# Patient Record
Sex: FEMALE | Race: WHITE | HISPANIC OR LATINO | Employment: FULL TIME | URBAN - METROPOLITAN AREA
[De-identification: names, ages, dates, MRNs, and addresses within clinical notes are randomized per-mention and may not be internally consistent; named-entity substitution may affect disease eponyms.]

---

## 2023-07-31 ENCOUNTER — HOSPITAL ENCOUNTER (INPATIENT)
Facility: MEDICAL CENTER | Age: 33
LOS: 3 days | DRG: 419 | End: 2023-08-03
Attending: HOSPITALIST | Admitting: STUDENT IN AN ORGANIZED HEALTH CARE EDUCATION/TRAINING PROGRAM
Payer: COMMERCIAL

## 2023-07-31 DIAGNOSIS — K81.9 CHOLECYSTITIS: ICD-10-CM

## 2023-07-31 DIAGNOSIS — K80.50 CHOLEDOCHOLITHIASIS: ICD-10-CM

## 2023-07-31 PROBLEM — K80.20 CHOLELITHIASIS WITHOUT CHOLANGITIS: Status: ACTIVE | Noted: 2023-07-31

## 2023-07-31 LAB
BASOPHILS # BLD AUTO: 0.4 % (ref 0–1.8)
BASOPHILS # BLD: 0.03 K/UL (ref 0–0.12)
EOSINOPHIL # BLD AUTO: 0.06 K/UL (ref 0–0.51)
EOSINOPHIL NFR BLD: 0.8 % (ref 0–6.9)
ERYTHROCYTE [DISTWIDTH] IN BLOOD BY AUTOMATED COUNT: 39.2 FL (ref 35.9–50)
HCT VFR BLD AUTO: 39.6 % (ref 37–47)
HGB BLD-MCNC: 13.3 G/DL (ref 12–16)
IMM GRANULOCYTES # BLD AUTO: 0.02 K/UL (ref 0–0.11)
IMM GRANULOCYTES NFR BLD AUTO: 0.3 % (ref 0–0.9)
LYMPHOCYTES # BLD AUTO: 1.81 K/UL (ref 1–4.8)
LYMPHOCYTES NFR BLD: 24.6 % (ref 22–41)
MCH RBC QN AUTO: 29.9 PG (ref 27–33)
MCHC RBC AUTO-ENTMCNC: 33.6 G/DL (ref 32.2–35.5)
MCV RBC AUTO: 89 FL (ref 81.4–97.8)
MONOCYTES # BLD AUTO: 0.59 K/UL (ref 0–0.85)
MONOCYTES NFR BLD AUTO: 8 % (ref 0–13.4)
NEUTROPHILS # BLD AUTO: 4.84 K/UL (ref 1.82–7.42)
NEUTROPHILS NFR BLD: 65.9 % (ref 44–72)
NRBC # BLD AUTO: 0 K/UL
NRBC BLD-RTO: 0 /100 WBC (ref 0–0.2)
PLATELET # BLD AUTO: 251 K/UL (ref 164–446)
PMV BLD AUTO: 9.5 FL (ref 9–12.9)
RBC # BLD AUTO: 4.45 M/UL (ref 4.2–5.4)
WBC # BLD AUTO: 7.4 K/UL (ref 4.8–10.8)

## 2023-07-31 PROCEDURE — 99223 1ST HOSP IP/OBS HIGH 75: CPT | Mod: AI | Performed by: STUDENT IN AN ORGANIZED HEALTH CARE EDUCATION/TRAINING PROGRAM

## 2023-07-31 PROCEDURE — 80053 COMPREHEN METABOLIC PANEL: CPT

## 2023-07-31 PROCEDURE — 85652 RBC SED RATE AUTOMATED: CPT

## 2023-07-31 PROCEDURE — 83735 ASSAY OF MAGNESIUM: CPT

## 2023-07-31 PROCEDURE — 36415 COLL VENOUS BLD VENIPUNCTURE: CPT

## 2023-07-31 PROCEDURE — 83605 ASSAY OF LACTIC ACID: CPT

## 2023-07-31 PROCEDURE — 83690 ASSAY OF LIPASE: CPT

## 2023-07-31 PROCEDURE — 84100 ASSAY OF PHOSPHORUS: CPT

## 2023-07-31 PROCEDURE — 85025 COMPLETE CBC W/AUTO DIFF WBC: CPT

## 2023-07-31 PROCEDURE — 85610 PROTHROMBIN TIME: CPT

## 2023-07-31 PROCEDURE — 770001 HCHG ROOM/CARE - MED/SURG/GYN PRIV*

## 2023-07-31 PROCEDURE — 86140 C-REACTIVE PROTEIN: CPT

## 2023-07-31 RX ORDER — BISACODYL 10 MG
10 SUPPOSITORY, RECTAL RECTAL
Status: DISCONTINUED | OUTPATIENT
Start: 2023-07-31 | End: 2023-08-03 | Stop reason: HOSPADM

## 2023-07-31 RX ORDER — OXYCODONE HYDROCHLORIDE 5 MG/1
5 TABLET ORAL 2 TIMES DAILY
Status: ON HOLD | COMMUNITY
End: 2023-08-03

## 2023-07-31 RX ORDER — TRAZODONE HYDROCHLORIDE 50 MG/1
75 TABLET ORAL NIGHTLY
Status: DISCONTINUED | OUTPATIENT
Start: 2023-08-01 | End: 2023-08-03 | Stop reason: HOSPADM

## 2023-07-31 RX ORDER — BUPROPION HYDROCHLORIDE 100 MG/1
150 TABLET ORAL 2 TIMES DAILY
COMMUNITY

## 2023-07-31 RX ORDER — CELECOXIB 200 MG/1
200 CAPSULE ORAL 2 TIMES DAILY
Status: DISCONTINUED | OUTPATIENT
Start: 2023-08-01 | End: 2023-08-02

## 2023-07-31 RX ORDER — ONDANSETRON 4 MG/1
4 TABLET, ORALLY DISINTEGRATING ORAL EVERY 4 HOURS PRN
Status: DISCONTINUED | OUTPATIENT
Start: 2023-07-31 | End: 2023-08-03 | Stop reason: HOSPADM

## 2023-07-31 RX ORDER — CELECOXIB 200 MG/1
200 CAPSULE ORAL 2 TIMES DAILY PRN
Status: DISCONTINUED | OUTPATIENT
Start: 2023-08-06 | End: 2023-08-02

## 2023-07-31 RX ORDER — SODIUM CHLORIDE, SODIUM LACTATE, POTASSIUM CHLORIDE, AND CALCIUM CHLORIDE .6; .31; .03; .02 G/100ML; G/100ML; G/100ML; G/100ML
500 INJECTION, SOLUTION INTRAVENOUS
Status: DISCONTINUED | OUTPATIENT
Start: 2023-07-31 | End: 2023-08-03 | Stop reason: HOSPADM

## 2023-07-31 RX ORDER — ACETAMINOPHEN 325 MG/1
650 TABLET ORAL EVERY 6 HOURS
Status: DISCONTINUED | OUTPATIENT
Start: 2023-08-01 | End: 2023-08-03 | Stop reason: HOSPADM

## 2023-07-31 RX ORDER — METHOCARBAMOL 500 MG/1
500 TABLET, FILM COATED ORAL 2 TIMES DAILY PRN
Status: DISCONTINUED | OUTPATIENT
Start: 2023-08-01 | End: 2023-08-03 | Stop reason: HOSPADM

## 2023-07-31 RX ORDER — MORPHINE SULFATE 4 MG/ML
4 INJECTION INTRAVENOUS
Status: DISCONTINUED | OUTPATIENT
Start: 2023-07-31 | End: 2023-08-03 | Stop reason: HOSPADM

## 2023-07-31 RX ORDER — ONDANSETRON 2 MG/ML
4 INJECTION INTRAMUSCULAR; INTRAVENOUS EVERY 4 HOURS PRN
Status: DISCONTINUED | OUTPATIENT
Start: 2023-07-31 | End: 2023-08-03 | Stop reason: HOSPADM

## 2023-07-31 RX ORDER — POLYETHYLENE GLYCOL 3350 17 G/17G
1 POWDER, FOR SOLUTION ORAL
Status: DISCONTINUED | OUTPATIENT
Start: 2023-07-31 | End: 2023-08-03 | Stop reason: HOSPADM

## 2023-07-31 RX ORDER — ACETAMINOPHEN 325 MG/1
650 TABLET ORAL EVERY 6 HOURS PRN
Status: DISCONTINUED | OUTPATIENT
Start: 2023-08-06 | End: 2023-08-03 | Stop reason: HOSPADM

## 2023-07-31 RX ORDER — BUPROPION HYDROCHLORIDE 75 MG/1
150 TABLET ORAL 2 TIMES DAILY
Status: DISCONTINUED | OUTPATIENT
Start: 2023-08-01 | End: 2023-08-01

## 2023-07-31 RX ORDER — OXYCODONE HYDROCHLORIDE 5 MG/1
5 TABLET ORAL
Status: DISCONTINUED | OUTPATIENT
Start: 2023-07-31 | End: 2023-07-31

## 2023-07-31 RX ORDER — LABETALOL HYDROCHLORIDE 5 MG/ML
10 INJECTION, SOLUTION INTRAVENOUS EVERY 4 HOURS PRN
Status: DISCONTINUED | OUTPATIENT
Start: 2023-07-31 | End: 2023-08-03 | Stop reason: HOSPADM

## 2023-07-31 RX ORDER — FEXOFENADINE HCL 60 MG/1
60 TABLET, FILM COATED ORAL DAILY
Status: DISCONTINUED | OUTPATIENT
Start: 2023-08-01 | End: 2023-08-03 | Stop reason: HOSPADM

## 2023-07-31 RX ORDER — TRAZODONE HYDROCHLORIDE 50 MG/1
75 TABLET ORAL NIGHTLY
COMMUNITY

## 2023-07-31 RX ORDER — METRONIDAZOLE 500 MG/100ML
500 INJECTION, SOLUTION INTRAVENOUS EVERY 12 HOURS
Status: DISCONTINUED | OUTPATIENT
Start: 2023-07-31 | End: 2023-08-02

## 2023-07-31 RX ORDER — FEXOFENADINE HCL 60 MG/1
60 TABLET, FILM COATED ORAL DAILY
COMMUNITY

## 2023-07-31 RX ORDER — GABAPENTIN 300 MG/1
300 CAPSULE ORAL 3 TIMES DAILY
Status: DISCONTINUED | OUTPATIENT
Start: 2023-08-01 | End: 2023-08-03 | Stop reason: HOSPADM

## 2023-07-31 RX ORDER — MONTELUKAST SODIUM 10 MG/1
10 TABLET ORAL DAILY
Status: DISCONTINUED | OUTPATIENT
Start: 2023-08-01 | End: 2023-08-03 | Stop reason: HOSPADM

## 2023-07-31 RX ORDER — OXYCODONE HYDROCHLORIDE 5 MG/1
5 TABLET ORAL
Status: DISCONTINUED | OUTPATIENT
Start: 2023-07-31 | End: 2023-08-03 | Stop reason: HOSPADM

## 2023-07-31 RX ORDER — GABAPENTIN 100 MG/1
300 CAPSULE ORAL 3 TIMES DAILY
COMMUNITY

## 2023-07-31 RX ORDER — PROCHLORPERAZINE EDISYLATE 5 MG/ML
5-10 INJECTION INTRAMUSCULAR; INTRAVENOUS EVERY 4 HOURS PRN
Status: DISCONTINUED | OUTPATIENT
Start: 2023-07-31 | End: 2023-08-03 | Stop reason: HOSPADM

## 2023-07-31 RX ORDER — MONTELUKAST SODIUM 10 MG/1
10 TABLET ORAL DAILY
COMMUNITY

## 2023-07-31 RX ORDER — AMOXICILLIN 250 MG
2 CAPSULE ORAL 2 TIMES DAILY
Status: DISCONTINUED | OUTPATIENT
Start: 2023-07-31 | End: 2023-08-03 | Stop reason: HOSPADM

## 2023-07-31 RX ORDER — PROMETHAZINE HYDROCHLORIDE 25 MG/1
12.5-25 TABLET ORAL EVERY 4 HOURS PRN
Status: DISCONTINUED | OUTPATIENT
Start: 2023-07-31 | End: 2023-08-03 | Stop reason: HOSPADM

## 2023-07-31 RX ORDER — OXYCODONE HYDROCHLORIDE 10 MG/1
10 TABLET ORAL
Status: DISCONTINUED | OUTPATIENT
Start: 2023-07-31 | End: 2023-08-03 | Stop reason: HOSPADM

## 2023-07-31 RX ORDER — ALBUTEROL SULFATE 90 UG/1
2 AEROSOL, METERED RESPIRATORY (INHALATION) EVERY 6 HOURS PRN
Status: DISCONTINUED | OUTPATIENT
Start: 2023-07-31 | End: 2023-08-03 | Stop reason: HOSPADM

## 2023-07-31 RX ORDER — PROMETHAZINE HYDROCHLORIDE 25 MG/1
12.5-25 SUPPOSITORY RECTAL EVERY 4 HOURS PRN
Status: DISCONTINUED | OUTPATIENT
Start: 2023-07-31 | End: 2023-08-03 | Stop reason: HOSPADM

## 2023-07-31 RX ORDER — MORPHINE SULFATE 4 MG/ML
2 INJECTION INTRAVENOUS
Status: DISCONTINUED | OUTPATIENT
Start: 2023-07-31 | End: 2023-07-31

## 2023-07-31 RX ORDER — METHOCARBAMOL 500 MG/1
500 TABLET, FILM COATED ORAL 2 TIMES DAILY
COMMUNITY

## 2023-07-31 RX ORDER — OXYCODONE HYDROCHLORIDE 5 MG/1
2.5 TABLET ORAL
Status: DISCONTINUED | OUTPATIENT
Start: 2023-07-31 | End: 2023-07-31

## 2023-07-31 RX ORDER — ACETAMINOPHEN 325 MG/1
650 TABLET ORAL EVERY 6 HOURS PRN
Status: DISCONTINUED | OUTPATIENT
Start: 2023-07-31 | End: 2023-08-01

## 2023-07-31 RX ORDER — ALBUTEROL SULFATE 90 UG/1
2 AEROSOL, METERED RESPIRATORY (INHALATION) EVERY 6 HOURS PRN
COMMUNITY

## 2023-07-31 ASSESSMENT — PAIN DESCRIPTION - PAIN TYPE: TYPE: ACUTE PAIN

## 2023-08-01 ENCOUNTER — ANESTHESIA EVENT (OUTPATIENT)
Dept: SURGERY | Facility: MEDICAL CENTER | Age: 33
DRG: 419 | End: 2023-08-01
Payer: COMMERCIAL

## 2023-08-01 ENCOUNTER — ANESTHESIA (OUTPATIENT)
Dept: SURGERY | Facility: MEDICAL CENTER | Age: 33
DRG: 419 | End: 2023-08-01
Payer: COMMERCIAL

## 2023-08-01 ENCOUNTER — APPOINTMENT (OUTPATIENT)
Dept: RADIOLOGY | Facility: MEDICAL CENTER | Age: 33
DRG: 419 | End: 2023-08-01
Attending: SURGERY
Payer: COMMERCIAL

## 2023-08-01 PROBLEM — G47.33 OSA (OBSTRUCTIVE SLEEP APNEA): Status: ACTIVE | Noted: 2023-08-01

## 2023-08-01 PROBLEM — K80.40 CHOLEDOCHOLITHIASIS WITH CHOLECYSTITIS: Status: ACTIVE | Noted: 2023-07-31

## 2023-08-01 PROBLEM — J45.909 ASTHMA: Status: ACTIVE | Noted: 2023-08-01

## 2023-08-01 PROBLEM — K80.20 CHOLELITHIASIS WITHOUT CHOLANGITIS: Status: RESOLVED | Noted: 2023-07-31 | Resolved: 2023-08-01

## 2023-08-01 PROBLEM — G89.4 CHRONIC PAIN SYNDROME: Status: ACTIVE | Noted: 2023-08-01

## 2023-08-01 PROBLEM — G47.00 INSOMNIA: Status: ACTIVE | Noted: 2023-08-01

## 2023-08-01 PROBLEM — R10.9 ABDOMINAL PAIN: Status: RESOLVED | Noted: 2023-08-01 | Resolved: 2023-08-01

## 2023-08-01 PROBLEM — K80.50 CHOLEDOCHOLITHIASIS: Status: ACTIVE | Noted: 2023-08-01

## 2023-08-01 PROBLEM — R11.2 INTRACTABLE NAUSEA AND VOMITING: Status: RESOLVED | Noted: 2023-08-01 | Resolved: 2023-08-01

## 2023-08-01 PROBLEM — R10.9 ABDOMINAL PAIN: Status: ACTIVE | Noted: 2023-08-01

## 2023-08-01 PROBLEM — E87.6 HYPOKALEMIA: Status: ACTIVE | Noted: 2023-08-01

## 2023-08-01 PROBLEM — R11.2 INTRACTABLE NAUSEA AND VOMITING: Status: ACTIVE | Noted: 2023-08-01

## 2023-08-01 LAB
ALBUMIN SERPL BCP-MCNC: 3.5 G/DL (ref 3.2–4.9)
ALBUMIN SERPL BCP-MCNC: 3.8 G/DL (ref 3.2–4.9)
ALBUMIN/GLOB SERPL: 1.5 G/DL
ALBUMIN/GLOB SERPL: 1.7 G/DL
ALP SERPL-CCNC: 67 U/L (ref 30–99)
ALP SERPL-CCNC: 71 U/L (ref 30–99)
ALT SERPL-CCNC: 32 U/L (ref 2–50)
ALT SERPL-CCNC: 39 U/L (ref 2–50)
ANION GAP SERPL CALC-SCNC: 10 MMOL/L (ref 7–16)
ANION GAP SERPL CALC-SCNC: 8 MMOL/L (ref 7–16)
APPEARANCE UR: CLEAR
AST SERPL-CCNC: 21 U/L (ref 12–45)
AST SERPL-CCNC: 28 U/L (ref 12–45)
BASOPHILS # BLD AUTO: 0.5 % (ref 0–1.8)
BASOPHILS # BLD: 0.03 K/UL (ref 0–0.12)
BILIRUB SERPL-MCNC: 0.4 MG/DL (ref 0.1–1.5)
BILIRUB SERPL-MCNC: 0.5 MG/DL (ref 0.1–1.5)
BILIRUB UR QL STRIP.AUTO: NEGATIVE
BUN SERPL-MCNC: 5 MG/DL (ref 8–22)
BUN SERPL-MCNC: 6 MG/DL (ref 8–22)
CALCIUM ALBUM COR SERPL-MCNC: 8.2 MG/DL (ref 8.5–10.5)
CALCIUM ALBUM COR SERPL-MCNC: 8.7 MG/DL (ref 8.5–10.5)
CALCIUM SERPL-MCNC: 7.8 MG/DL (ref 8.5–10.5)
CALCIUM SERPL-MCNC: 8.5 MG/DL (ref 8.5–10.5)
CHLORIDE SERPL-SCNC: 106 MMOL/L (ref 96–112)
CHLORIDE SERPL-SCNC: 108 MMOL/L (ref 96–112)
CO2 SERPL-SCNC: 25 MMOL/L (ref 20–33)
CO2 SERPL-SCNC: 25 MMOL/L (ref 20–33)
COLOR UR: YELLOW
CREAT SERPL-MCNC: 0.48 MG/DL (ref 0.5–1.4)
CREAT SERPL-MCNC: 0.51 MG/DL (ref 0.5–1.4)
CRP SERPL HS-MCNC: <0.3 MG/DL (ref 0–0.75)
EOSINOPHIL # BLD AUTO: 0.08 K/UL (ref 0–0.51)
EOSINOPHIL NFR BLD: 1.4 % (ref 0–6.9)
ERYTHROCYTE [DISTWIDTH] IN BLOOD BY AUTOMATED COUNT: 40.8 FL (ref 35.9–50)
ERYTHROCYTE [SEDIMENTATION RATE] IN BLOOD BY WESTERGREN METHOD: 6 MM/HOUR (ref 0–25)
GFR SERPLBLD CREATININE-BSD FMLA CKD-EPI: 127 ML/MIN/1.73 M 2
GFR SERPLBLD CREATININE-BSD FMLA CKD-EPI: 129 ML/MIN/1.73 M 2
GLOBULIN SER CALC-MCNC: 2.3 G/DL (ref 1.9–3.5)
GLOBULIN SER CALC-MCNC: 2.3 G/DL (ref 1.9–3.5)
GLUCOSE SERPL-MCNC: 89 MG/DL (ref 65–99)
GLUCOSE SERPL-MCNC: 96 MG/DL (ref 65–99)
GLUCOSE UR STRIP.AUTO-MCNC: NEGATIVE MG/DL
HCG UR QL: NEGATIVE
HCT VFR BLD AUTO: 38.7 % (ref 37–47)
HGB BLD-MCNC: 12.7 G/DL (ref 12–16)
IMM GRANULOCYTES # BLD AUTO: 0.01 K/UL (ref 0–0.11)
IMM GRANULOCYTES NFR BLD AUTO: 0.2 % (ref 0–0.9)
INR PPP: 1.28 (ref 0.87–1.13)
KETONES UR STRIP.AUTO-MCNC: ABNORMAL MG/DL
LACTATE SERPL-SCNC: 0.9 MMOL/L (ref 0.5–2)
LEUKOCYTE ESTERASE UR QL STRIP.AUTO: NEGATIVE
LIPASE SERPL-CCNC: 12 U/L (ref 11–82)
LYMPHOCYTES # BLD AUTO: 2.05 K/UL (ref 1–4.8)
LYMPHOCYTES NFR BLD: 35.8 % (ref 22–41)
MAGNESIUM SERPL-MCNC: 2.1 MG/DL (ref 1.5–2.5)
MCH RBC QN AUTO: 29.7 PG (ref 27–33)
MCHC RBC AUTO-ENTMCNC: 32.8 G/DL (ref 32.2–35.5)
MCV RBC AUTO: 90.6 FL (ref 81.4–97.8)
MICRO URNS: ABNORMAL
MONOCYTES # BLD AUTO: 0.44 K/UL (ref 0–0.85)
MONOCYTES NFR BLD AUTO: 7.7 % (ref 0–13.4)
NEUTROPHILS # BLD AUTO: 3.11 K/UL (ref 1.82–7.42)
NEUTROPHILS NFR BLD: 54.4 % (ref 44–72)
NITRITE UR QL STRIP.AUTO: NEGATIVE
NRBC # BLD AUTO: 0 K/UL
NRBC BLD-RTO: 0 /100 WBC (ref 0–0.2)
PATHOLOGY CONSULT NOTE: NORMAL
PH UR STRIP.AUTO: 5.5 [PH] (ref 5–8)
PHOSPHATE SERPL-MCNC: 3.2 MG/DL (ref 2.5–4.5)
PLATELET # BLD AUTO: 243 K/UL (ref 164–446)
PMV BLD AUTO: 9.6 FL (ref 9–12.9)
POTASSIUM SERPL-SCNC: 3.4 MMOL/L (ref 3.6–5.5)
POTASSIUM SERPL-SCNC: 3.9 MMOL/L (ref 3.6–5.5)
PROT SERPL-MCNC: 5.8 G/DL (ref 6–8.2)
PROT SERPL-MCNC: 6.1 G/DL (ref 6–8.2)
PROT UR QL STRIP: NEGATIVE MG/DL
PROTHROMBIN TIME: 15.8 SEC (ref 12–14.6)
RBC # BLD AUTO: 4.27 M/UL (ref 4.2–5.4)
RBC UR QL AUTO: NEGATIVE
SODIUM SERPL-SCNC: 141 MMOL/L (ref 135–145)
SODIUM SERPL-SCNC: 141 MMOL/L (ref 135–145)
SP GR UR STRIP.AUTO: >=1.045
UROBILINOGEN UR STRIP.AUTO-MCNC: 1 MG/DL
WBC # BLD AUTO: 5.7 K/UL (ref 4.8–10.8)

## 2023-08-01 PROCEDURE — 74300 X-RAY BILE DUCTS/PANCREAS: CPT

## 2023-08-01 PROCEDURE — 81003 URINALYSIS AUTO W/O SCOPE: CPT

## 2023-08-01 PROCEDURE — 47563 LAPARO CHOLECYSTECTOMY/GRAPH: CPT | Mod: AS | Performed by: NURSE PRACTITIONER

## 2023-08-01 PROCEDURE — 700102 HCHG RX REV CODE 250 W/ 637 OVERRIDE(OP): Performed by: STUDENT IN AN ORGANIZED HEALTH CARE EDUCATION/TRAINING PROGRAM

## 2023-08-01 PROCEDURE — 700102 HCHG RX REV CODE 250 W/ 637 OVERRIDE(OP): Performed by: ANESTHESIOLOGY

## 2023-08-01 PROCEDURE — 99222 1ST HOSP IP/OBS MODERATE 55: CPT | Mod: 57 | Performed by: SURGERY

## 2023-08-01 PROCEDURE — 700111 HCHG RX REV CODE 636 W/ 250 OVERRIDE (IP): Performed by: ANESTHESIOLOGY

## 2023-08-01 PROCEDURE — 36415 COLL VENOUS BLD VENIPUNCTURE: CPT

## 2023-08-01 PROCEDURE — 160041 HCHG SURGERY MINUTES - EA ADDL 1 MIN LEVEL 4: Performed by: SURGERY

## 2023-08-01 PROCEDURE — 160035 HCHG PACU - 1ST 60 MINS PHASE I: Performed by: SURGERY

## 2023-08-01 PROCEDURE — 160002 HCHG RECOVERY MINUTES (STAT): Performed by: SURGERY

## 2023-08-01 PROCEDURE — 700105 HCHG RX REV CODE 258: Performed by: ANESTHESIOLOGY

## 2023-08-01 PROCEDURE — A9270 NON-COVERED ITEM OR SERVICE: HCPCS | Performed by: STUDENT IN AN ORGANIZED HEALTH CARE EDUCATION/TRAINING PROGRAM

## 2023-08-01 PROCEDURE — 160009 HCHG ANES TIME/MIN: Performed by: SURGERY

## 2023-08-01 PROCEDURE — 99222 1ST HOSP IP/OBS MODERATE 55: CPT | Performed by: INTERNAL MEDICINE

## 2023-08-01 PROCEDURE — 160029 HCHG SURGERY MINUTES - 1ST 30 MINS LEVEL 4: Performed by: SURGERY

## 2023-08-01 PROCEDURE — 88304 TISSUE EXAM BY PATHOLOGIST: CPT

## 2023-08-01 PROCEDURE — 700105 HCHG RX REV CODE 258: Mod: JZ | Performed by: ANESTHESIOLOGY

## 2023-08-01 PROCEDURE — 47563 LAPARO CHOLECYSTECTOMY/GRAPH: CPT | Performed by: SURGERY

## 2023-08-01 PROCEDURE — 110371 HCHG SHELL REV 272: Performed by: SURGERY

## 2023-08-01 PROCEDURE — 700111 HCHG RX REV CODE 636 W/ 250 OVERRIDE (IP): Mod: JZ | Performed by: STUDENT IN AN ORGANIZED HEALTH CARE EDUCATION/TRAINING PROGRAM

## 2023-08-01 PROCEDURE — 160048 HCHG OR STATISTICAL LEVEL 1-5: Performed by: SURGERY

## 2023-08-01 PROCEDURE — 160036 HCHG PACU - EA ADDL 30 MINS PHASE I: Performed by: SURGERY

## 2023-08-01 PROCEDURE — 0FT44ZZ RESECTION OF GALLBLADDER, PERCUTANEOUS ENDOSCOPIC APPROACH: ICD-10-PCS | Performed by: SURGERY

## 2023-08-01 PROCEDURE — A9270 NON-COVERED ITEM OR SERVICE: HCPCS | Performed by: ANESTHESIOLOGY

## 2023-08-01 PROCEDURE — 80053 COMPREHEN METABOLIC PANEL: CPT

## 2023-08-01 PROCEDURE — 87040 BLOOD CULTURE FOR BACTERIA: CPT

## 2023-08-01 PROCEDURE — 700117 HCHG RX CONTRAST REV CODE 255: Performed by: SURGERY

## 2023-08-01 PROCEDURE — 85025 COMPLETE CBC W/AUTO DIFF WBC: CPT

## 2023-08-01 PROCEDURE — 700111 HCHG RX REV CODE 636 W/ 250 OVERRIDE (IP): Performed by: SURGERY

## 2023-08-01 PROCEDURE — 700101 HCHG RX REV CODE 250: Performed by: STUDENT IN AN ORGANIZED HEALTH CARE EDUCATION/TRAINING PROGRAM

## 2023-08-01 PROCEDURE — 99231 SBSQ HOSP IP/OBS SF/LOW 25: CPT | Performed by: STUDENT IN AN ORGANIZED HEALTH CARE EDUCATION/TRAINING PROGRAM

## 2023-08-01 PROCEDURE — 700101 HCHG RX REV CODE 250: Performed by: ANESTHESIOLOGY

## 2023-08-01 PROCEDURE — BF101ZZ FLUOROSCOPY OF BILE DUCTS USING LOW OSMOLAR CONTRAST: ICD-10-PCS | Performed by: SURGERY

## 2023-08-01 PROCEDURE — 770001 HCHG ROOM/CARE - MED/SURG/GYN PRIV*

## 2023-08-01 PROCEDURE — 81025 URINE PREGNANCY TEST: CPT

## 2023-08-01 RX ORDER — HYDROMORPHONE HYDROCHLORIDE 1 MG/ML
0.2 INJECTION, SOLUTION INTRAMUSCULAR; INTRAVENOUS; SUBCUTANEOUS
Status: DISCONTINUED | OUTPATIENT
Start: 2023-08-01 | End: 2023-08-01 | Stop reason: HOSPADM

## 2023-08-01 RX ORDER — MEPERIDINE HYDROCHLORIDE 25 MG/ML
12.5 INJECTION INTRAMUSCULAR; INTRAVENOUS; SUBCUTANEOUS
Status: DISCONTINUED | OUTPATIENT
Start: 2023-08-01 | End: 2023-08-01 | Stop reason: HOSPADM

## 2023-08-01 RX ORDER — DEXAMETHASONE SODIUM PHOSPHATE 4 MG/ML
INJECTION, SOLUTION INTRA-ARTICULAR; INTRALESIONAL; INTRAMUSCULAR; INTRAVENOUS; SOFT TISSUE PRN
Status: DISCONTINUED | OUTPATIENT
Start: 2023-08-01 | End: 2023-08-01 | Stop reason: SURG

## 2023-08-01 RX ORDER — CEFOTETAN DISODIUM 2 G/20ML
INJECTION, POWDER, FOR SOLUTION INTRAMUSCULAR; INTRAVENOUS PRN
Status: DISCONTINUED | OUTPATIENT
Start: 2023-08-01 | End: 2023-08-01 | Stop reason: SURG

## 2023-08-01 RX ORDER — LIDOCAINE HYDROCHLORIDE 20 MG/ML
INJECTION, SOLUTION EPIDURAL; INFILTRATION; INTRACAUDAL; PERINEURAL PRN
Status: DISCONTINUED | OUTPATIENT
Start: 2023-08-01 | End: 2023-08-01 | Stop reason: SURG

## 2023-08-01 RX ORDER — DIPHENHYDRAMINE HYDROCHLORIDE 50 MG/ML
12.5 INJECTION INTRAMUSCULAR; INTRAVENOUS
Status: DISCONTINUED | OUTPATIENT
Start: 2023-08-01 | End: 2023-08-01 | Stop reason: HOSPADM

## 2023-08-01 RX ORDER — OXYCODONE HCL 5 MG/5 ML
10 SOLUTION, ORAL ORAL
Status: COMPLETED | OUTPATIENT
Start: 2023-08-01 | End: 2023-08-01

## 2023-08-01 RX ORDER — HYDRALAZINE HYDROCHLORIDE 20 MG/ML
5 INJECTION INTRAMUSCULAR; INTRAVENOUS
Status: DISCONTINUED | OUTPATIENT
Start: 2023-08-01 | End: 2023-08-01 | Stop reason: HOSPADM

## 2023-08-01 RX ORDER — OXYCODONE HCL 5 MG/5 ML
5 SOLUTION, ORAL ORAL
Status: COMPLETED | OUTPATIENT
Start: 2023-08-01 | End: 2023-08-01

## 2023-08-01 RX ORDER — FAMOTIDINE 20 MG/1
20 TABLET, FILM COATED ORAL 2 TIMES DAILY
Status: DISCONTINUED | OUTPATIENT
Start: 2023-08-01 | End: 2023-08-03 | Stop reason: HOSPADM

## 2023-08-01 RX ORDER — SODIUM CHLORIDE, SODIUM LACTATE, POTASSIUM CHLORIDE, CALCIUM CHLORIDE 600; 310; 30; 20 MG/100ML; MG/100ML; MG/100ML; MG/100ML
INJECTION, SOLUTION INTRAVENOUS CONTINUOUS
Status: DISCONTINUED | OUTPATIENT
Start: 2023-08-01 | End: 2023-08-01 | Stop reason: HOSPADM

## 2023-08-01 RX ORDER — KETOROLAC TROMETHAMINE 30 MG/ML
15 INJECTION, SOLUTION INTRAMUSCULAR; INTRAVENOUS EVERY 6 HOURS
Status: DISCONTINUED | OUTPATIENT
Start: 2023-08-01 | End: 2023-08-03 | Stop reason: HOSPADM

## 2023-08-01 RX ORDER — METOCLOPRAMIDE HYDROCHLORIDE 5 MG/ML
INJECTION INTRAMUSCULAR; INTRAVENOUS PRN
Status: DISCONTINUED | OUTPATIENT
Start: 2023-08-01 | End: 2023-08-01 | Stop reason: SURG

## 2023-08-01 RX ORDER — LABETALOL HYDROCHLORIDE 5 MG/ML
5 INJECTION, SOLUTION INTRAVENOUS
Status: DISCONTINUED | OUTPATIENT
Start: 2023-08-01 | End: 2023-08-01 | Stop reason: HOSPADM

## 2023-08-01 RX ORDER — ONDANSETRON 2 MG/ML
INJECTION INTRAMUSCULAR; INTRAVENOUS PRN
Status: DISCONTINUED | OUTPATIENT
Start: 2023-08-01 | End: 2023-08-01 | Stop reason: SURG

## 2023-08-01 RX ORDER — KETOROLAC TROMETHAMINE 30 MG/ML
INJECTION, SOLUTION INTRAMUSCULAR; INTRAVENOUS PRN
Status: DISCONTINUED | OUTPATIENT
Start: 2023-08-01 | End: 2023-08-01 | Stop reason: SURG

## 2023-08-01 RX ORDER — SODIUM CHLORIDE, SODIUM LACTATE, POTASSIUM CHLORIDE, CALCIUM CHLORIDE 600; 310; 30; 20 MG/100ML; MG/100ML; MG/100ML; MG/100ML
INJECTION, SOLUTION INTRAVENOUS
Status: DISCONTINUED | OUTPATIENT
Start: 2023-08-01 | End: 2023-08-01 | Stop reason: SURG

## 2023-08-01 RX ORDER — ONDANSETRON 2 MG/ML
4 INJECTION INTRAMUSCULAR; INTRAVENOUS
Status: DISCONTINUED | OUTPATIENT
Start: 2023-08-01 | End: 2023-08-01 | Stop reason: HOSPADM

## 2023-08-01 RX ORDER — SODIUM CHLORIDE AND POTASSIUM CHLORIDE 300; 900 MG/100ML; MG/100ML
1000 INJECTION, SOLUTION INTRAVENOUS CONTINUOUS
Status: DISPENSED | OUTPATIENT
Start: 2023-08-01 | End: 2023-08-01

## 2023-08-01 RX ORDER — HYDROMORPHONE HYDROCHLORIDE 1 MG/ML
0.1 INJECTION, SOLUTION INTRAMUSCULAR; INTRAVENOUS; SUBCUTANEOUS
Status: DISCONTINUED | OUTPATIENT
Start: 2023-08-01 | End: 2023-08-01 | Stop reason: HOSPADM

## 2023-08-01 RX ORDER — BUPIVACAINE HYDROCHLORIDE 2.5 MG/ML
INJECTION, SOLUTION EPIDURAL; INFILTRATION; INTRACAUDAL
Status: DISCONTINUED | OUTPATIENT
Start: 2023-08-01 | End: 2023-08-01 | Stop reason: HOSPADM

## 2023-08-01 RX ORDER — HALOPERIDOL 5 MG/ML
1 INJECTION INTRAMUSCULAR
Status: DISCONTINUED | OUTPATIENT
Start: 2023-08-01 | End: 2023-08-01 | Stop reason: HOSPADM

## 2023-08-01 RX ORDER — HYDROMORPHONE HYDROCHLORIDE 1 MG/ML
0.4 INJECTION, SOLUTION INTRAMUSCULAR; INTRAVENOUS; SUBCUTANEOUS
Status: DISCONTINUED | OUTPATIENT
Start: 2023-08-01 | End: 2023-08-01 | Stop reason: HOSPADM

## 2023-08-01 RX ADMIN — CEFOTETAN DISODIUM 2 G: 2 INJECTION, POWDER, FOR SOLUTION INTRAMUSCULAR; INTRAVENOUS at 10:19

## 2023-08-01 RX ADMIN — POTASSIUM CHLORIDE AND SODIUM CHLORIDE 1000 ML: 900; 300 INJECTION, SOLUTION INTRAVENOUS at 01:45

## 2023-08-01 RX ADMIN — GABAPENTIN 300 MG: 300 CAPSULE ORAL at 22:07

## 2023-08-01 RX ADMIN — PROPOFOL 200 MG: 10 INJECTION, EMULSION INTRAVENOUS at 10:19

## 2023-08-01 RX ADMIN — PROCHLORPERAZINE EDISYLATE 5 MG: 5 INJECTION INTRAMUSCULAR; INTRAVENOUS at 20:46

## 2023-08-01 RX ADMIN — LIDOCAINE HYDROCHLORIDE 100 MG: 20 INJECTION, SOLUTION EPIDURAL; INFILTRATION; INTRACAUDAL at 10:19

## 2023-08-01 RX ADMIN — HYDROMORPHONE HYDROCHLORIDE 0.2 MG: 1 INJECTION, SOLUTION INTRAMUSCULAR; INTRAVENOUS; SUBCUTANEOUS at 11:44

## 2023-08-01 RX ADMIN — EPHEDRINE SULFATE 10 MG: 50 INJECTION, SOLUTION INTRAVENOUS at 10:48

## 2023-08-01 RX ADMIN — KETOROLAC TROMETHAMINE 30 MG: 30 INJECTION, SOLUTION INTRAMUSCULAR; INTRAVENOUS at 10:50

## 2023-08-01 RX ADMIN — FENTANYL CITRATE 25 MCG: 50 INJECTION, SOLUTION INTRAMUSCULAR; INTRAVENOUS at 11:34

## 2023-08-01 RX ADMIN — FENTANYL CITRATE 50 MCG: 50 INJECTION, SOLUTION INTRAMUSCULAR; INTRAVENOUS at 10:40

## 2023-08-01 RX ADMIN — HYDROMORPHONE HYDROCHLORIDE 0.4 MG: 1 INJECTION, SOLUTION INTRAMUSCULAR; INTRAVENOUS; SUBCUTANEOUS at 11:56

## 2023-08-01 RX ADMIN — MONTELUKAST 10 MG: 10 TABLET, FILM COATED ORAL at 05:26

## 2023-08-01 RX ADMIN — HYDROMORPHONE HYDROCHLORIDE 0.2 MG: 1 INJECTION, SOLUTION INTRAMUSCULAR; INTRAVENOUS; SUBCUTANEOUS at 11:50

## 2023-08-01 RX ADMIN — METOCLOPRAMIDE 10 MG: 5 INJECTION, SOLUTION INTRAMUSCULAR; INTRAVENOUS at 10:50

## 2023-08-01 RX ADMIN — MORPHINE SULFATE 4 MG: 4 INJECTION, SOLUTION INTRAMUSCULAR; INTRAVENOUS at 20:46

## 2023-08-01 RX ADMIN — FENTANYL CITRATE 50 MCG: 50 INJECTION, SOLUTION INTRAMUSCULAR; INTRAVENOUS at 10:27

## 2023-08-01 RX ADMIN — TRAZODONE HYDROCHLORIDE 75 MG: 50 TABLET ORAL at 22:07

## 2023-08-01 RX ADMIN — METHOCARBAMOL 1000 MG: 100 INJECTION, SOLUTION INTRAMUSCULAR; INTRAVENOUS at 12:06

## 2023-08-01 RX ADMIN — METRONIDAZOLE 500 MG: 500 INJECTION, SOLUTION INTRAVENOUS at 17:31

## 2023-08-01 RX ADMIN — ONDANSETRON 4 MG: 2 INJECTION INTRAMUSCULAR; INTRAVENOUS at 08:13

## 2023-08-01 RX ADMIN — ONDANSETRON 4 MG: 2 INJECTION INTRAMUSCULAR; INTRAVENOUS at 10:50

## 2023-08-01 RX ADMIN — FEXOFENADINE HCL 60 MG: 60 TABLET, FILM COATED ORAL at 05:26

## 2023-08-01 RX ADMIN — SUGAMMADEX 200 MG: 100 INJECTION, SOLUTION INTRAVENOUS at 10:52

## 2023-08-01 RX ADMIN — PROMETHAZINE HYDROCHLORIDE 25 MG: 25 TABLET ORAL at 18:48

## 2023-08-01 RX ADMIN — SODIUM CHLORIDE, POTASSIUM CHLORIDE, SODIUM LACTATE AND CALCIUM CHLORIDE: 600; 310; 30; 20 INJECTION, SOLUTION INTRAVENOUS at 10:17

## 2023-08-01 RX ADMIN — ONDANSETRON 4 MG: 2 INJECTION INTRAMUSCULAR; INTRAVENOUS at 17:04

## 2023-08-01 RX ADMIN — FENTANYL CITRATE 50 MCG: 50 INJECTION, SOLUTION INTRAMUSCULAR; INTRAVENOUS at 11:22

## 2023-08-01 RX ADMIN — FAMOTIDINE 20 MG: 10 INJECTION INTRAVENOUS at 05:26

## 2023-08-01 RX ADMIN — ACETAMINOPHEN 650 MG: 325 TABLET, FILM COATED ORAL at 05:26

## 2023-08-01 RX ADMIN — KETOROLAC TROMETHAMINE 15 MG: 30 INJECTION, SOLUTION INTRAMUSCULAR; INTRAVENOUS at 17:04

## 2023-08-01 RX ADMIN — FENTANYL CITRATE 25 MCG: 50 INJECTION, SOLUTION INTRAMUSCULAR; INTRAVENOUS at 11:31

## 2023-08-01 RX ADMIN — OXYCODONE HYDROCHLORIDE 10 MG: 10 TABLET ORAL at 05:27

## 2023-08-01 RX ADMIN — ROCURONIUM BROMIDE 50 MG: 10 INJECTION, SOLUTION INTRAVENOUS at 10:19

## 2023-08-01 RX ADMIN — DEXAMETHASONE SODIUM PHOSPHATE 8 MG: 4 INJECTION INTRA-ARTICULAR; INTRALESIONAL; INTRAMUSCULAR; INTRAVENOUS; SOFT TISSUE at 10:19

## 2023-08-01 RX ADMIN — METRONIDAZOLE 500 MG: 500 INJECTION, SOLUTION INTRAVENOUS at 00:34

## 2023-08-01 RX ADMIN — TRAZODONE HYDROCHLORIDE 75 MG: 50 TABLET ORAL at 00:32

## 2023-08-01 RX ADMIN — OXYCODONE HYDROCHLORIDE 10 MG: 10 TABLET ORAL at 18:52

## 2023-08-01 RX ADMIN — ACETAMINOPHEN 650 MG: 325 TABLET, FILM COATED ORAL at 00:31

## 2023-08-01 RX ADMIN — OXYCODONE HYDROCHLORIDE 10 MG: 5 SOLUTION ORAL at 11:21

## 2023-08-01 RX ADMIN — OXYCODONE HYDROCHLORIDE 10 MG: 10 TABLET ORAL at 00:31

## 2023-08-01 RX ADMIN — METRONIDAZOLE 500 MG: 500 INJECTION, SOLUTION INTRAVENOUS at 05:26

## 2023-08-01 RX ADMIN — MIDAZOLAM 2 MG: 1 INJECTION, SOLUTION INTRAMUSCULAR; INTRAVENOUS at 10:17

## 2023-08-01 ASSESSMENT — ENCOUNTER SYMPTOMS
BRUISES/BLEEDS EASILY: 0
SENSORY CHANGE: 0
BLOOD IN STOOL: 0
VOMITING: 0
MYALGIAS: 0
DEPRESSION: 0
CHILLS: 0
NAUSEA: 1
VOMITING: 1
HEADACHES: 0
BLURRED VISION: 0
HEARTBURN: 1
CHILLS: 1
SHORTNESS OF BREATH: 0
DIARRHEA: 0
FEVER: 0
DIZZINESS: 0
ABDOMINAL PAIN: 1
FOCAL WEAKNESS: 0
CONSTIPATION: 0
DOUBLE VISION: 0
COUGH: 0
PALPITATIONS: 0
WEAKNESS: 1

## 2023-08-01 ASSESSMENT — LIFESTYLE VARIABLES
AVERAGE NUMBER OF DAYS PER WEEK YOU HAVE A DRINK CONTAINING ALCOHOL: 0
EVER FELT BAD OR GUILTY ABOUT YOUR DRINKING: NO
HAVE YOU EVER FELT YOU SHOULD CUT DOWN ON YOUR DRINKING: NO
TOTAL SCORE: 0
DOES PATIENT WANT TO STOP DRINKING: NO
SUBSTANCE_ABUSE: 0
ALCOHOL_USE: YES
HAVE PEOPLE ANNOYED YOU BY CRITICIZING YOUR DRINKING: NO
TOTAL SCORE: 0
HOW MANY TIMES IN THE PAST YEAR HAVE YOU HAD 5 OR MORE DRINKS IN A DAY: 0
EVER HAD A DRINK FIRST THING IN THE MORNING TO STEADY YOUR NERVES TO GET RID OF A HANGOVER: NO
TOTAL SCORE: 0
CONSUMPTION TOTAL: NEGATIVE
ON A TYPICAL DAY WHEN YOU DRINK ALCOHOL HOW MANY DRINKS DO YOU HAVE: 2

## 2023-08-01 ASSESSMENT — COGNITIVE AND FUNCTIONAL STATUS - GENERAL
SUGGESTED CMS G CODE MODIFIER MOBILITY: CH
SUGGESTED CMS G CODE MODIFIER DAILY ACTIVITY: CH
MOBILITY SCORE: 24
DAILY ACTIVITIY SCORE: 24

## 2023-08-01 ASSESSMENT — PAIN DESCRIPTION - PAIN TYPE
TYPE: ACUTE PAIN

## 2023-08-01 ASSESSMENT — PATIENT HEALTH QUESTIONNAIRE - PHQ9
1. LITTLE INTEREST OR PLEASURE IN DOING THINGS: NOT AT ALL
2. FEELING DOWN, DEPRESSED, IRRITABLE, OR HOPELESS: NOT AT ALL
SUM OF ALL RESPONSES TO PHQ9 QUESTIONS 1 AND 2: 0

## 2023-08-01 NOTE — CARE PLAN
The patient is Stable - Low risk of patient condition declining or worsening    Shift Goals  Clinical Goals: prep for pre-op  Patient Goals: pain control, nausea control    Progress made toward(s) clinical / shift goals:      Patient's pain will be controlled with PRN and scheduled pain medication, PRN antiemetics, and having a surgical procedure.    Problem: Pain - Standard  Goal: Alleviation of pain or a reduction in pain to the patient’s comfort goal  Outcome: Progressing

## 2023-08-01 NOTE — PROGRESS NOTES
Hospital Medicine Daily Progress Note    Date of Service  8/1/2023    Chief Complaint  Marilu Velarde is a 33 y.o. female admitted 7/31/2023 with abd pain    Hospital Course  Marilu New is a 33 y.o. female with history of asthma, chronic pain syndrome who presented 7/31/2023 with evaluation for cholecystitis, possible choledocholithiasis.     Work-up for [facility] ER included:  CTAP with contrast: No acute abdominal pelvic abnormality  Ultrasound abdomen limited: Cholelithiasis with positive Lopez sign suggesting cholecystitis.  7 mm dilated mildly CBD.  Downstream obstruction or choledocholithiasis may be present.       Ultimately, patient was transferred to [facility] for higher level of care.  Patient reported having biliary colic complaint as of last Friday.  She went to the emergency room, noted to have gallstones but no admitted and discharged with pain control.  She returned to ER earlier today due to recurrent of abdominal pain.  CTAP and right upper quadrant ultrasound as above.  Transferred to [facility] for possible MRCP and higher level of care.     Interval Problem Update  Underwent laparoscopic cholecystectomy with intraoperative cholangiograph w/o complication, noted to get hypoxic while asleep pt states she has been evaluated for NOLAN as outpt, possible ERCP tomorrow    I have discussed this patient's plan of care and discharge plan at IDT rounds today with Case Management, Nursing, Nursing leadership, and other members of the IDT team.    Consultants/Specialty  general surgery    Code Status  Full Code    Disposition  The patient is not medically cleared for discharge to home or a post-acute facility.  Anticipate discharge to: home with close outpatient follow-up    I have placed the appropriate orders for post-discharge needs.    Review of Systems  Review of Systems   Constitutional:  Negative for chills and fever.   HENT:  Negative for congestion.    Eyes:  Negative for blurred  vision.   Respiratory:  Negative for shortness of breath.    Cardiovascular:  Negative for chest pain and leg swelling.   Gastrointestinal:  Positive for abdominal pain and nausea. Negative for constipation, diarrhea and vomiting.   Genitourinary:  Negative for dysuria.   Musculoskeletal:  Negative for myalgias.   Skin:  Negative for rash.   Neurological:  Negative for sensory change and focal weakness.        Physical Exam  Temp:  [35.8 °C (96.5 °F)-37 °C (98.6 °F)] 36.7 °C (98.1 °F)  Pulse:  [48-72] 61  Resp:  [12-26] 18  BP: ()/(56-73) 112/69  SpO2:  [91 %-100 %] 92 %    Physical Exam  Constitutional:       General: She is not in acute distress.     Appearance: Normal appearance.   HENT:      Head: Normocephalic and atraumatic.      Nose: Nose normal.      Mouth/Throat:      Mouth: Mucous membranes are moist.      Pharynx: Oropharynx is clear.   Eyes:      Conjunctiva/sclera: Conjunctivae normal.      Pupils: Pupils are equal, round, and reactive to light.   Cardiovascular:      Rate and Rhythm: Normal rate and regular rhythm.      Heart sounds: No murmur heard.  Pulmonary:      Effort: Pulmonary effort is normal.      Breath sounds: No wheezing, rhonchi or rales.   Abdominal:      General: There is no distension.      Palpations: Abdomen is soft.      Tenderness: There is abdominal tenderness in the right upper quadrant and epigastric area. There is no guarding or rebound.      Comments: Incisions c/d/i   Musculoskeletal:         General: No swelling or tenderness.      Cervical back: Normal range of motion and neck supple.   Skin:     General: Skin is warm and dry.   Neurological:      Mental Status: She is alert.      GCS: GCS eye subscore is 4. GCS verbal subscore is 5. GCS motor subscore is 6.      Cranial Nerves: No facial asymmetry.   Psychiatric:         Mood and Affect: Mood normal.         Behavior: Behavior normal.         Fluids    Intake/Output Summary (Last 24 hours) at 8/1/2023 3927  Last  data filed at 8/1/2023 1100  Gross per 24 hour   Intake 800 ml   Output 10 ml   Net 790 ml       Laboratory  Recent Labs     07/31/23 2333 08/01/23  0507   WBC 7.4 5.7   RBC 4.45 4.27   HEMOGLOBIN 13.3 12.7   HEMATOCRIT 39.6 38.7   MCV 89.0 90.6   MCH 29.9 29.7   MCHC 33.6 32.8   RDW 39.2 40.8   PLATELETCT 251 243   MPV 9.5 9.6     Recent Labs     07/31/23 2333 08/01/23  0507   SODIUM 141 141   POTASSIUM 3.4* 3.9   CHLORIDE 106 108   CO2 25 25   GLUCOSE 96 89   BUN 6* 5*   CREATININE 0.51 0.48*   CALCIUM 8.5 7.8*     Recent Labs     07/31/23 2333   INR 1.28*               Imaging  OUTSIDE IMAGES-US ABDOMEN   Final Result      OUTSIDE IMAGES-US ABDOMEN   Final Result      OUTSIDE IMAGES-CT ABDOMEN /PELVIS   Final Result      OUTSIDE IMAGES-CT ABDOMEN /PELVIS   Final Result      [id number]    (Results Pending)        Assessment/Plan  * Cholecystitis  Assessment & Plan  Sonographic Lopez and 7mm CBD on RUQ US, s/p lap goldne w/ cholangiograph on 8/1  -Supportive pain control  -Flagyl  -Follow cultures  -Surgery following, will appreciate recs    NOLAN (obstructive sleep apnea)  Assessment & Plan  Possible NOLAN, desated while asleep post-op, was evaluated as outpt w/ sleep study but states provider moved before she could see her for results/tx  -Outpt f/u      Hypokalemia  Assessment & Plan  Monitor and replete    Choledocholithiasis  Assessment & Plan  s/p lap golden w/ cholangiograph on 8/1   -Possible ERCP on 8/2    Chronic pain syndrome  Assessment & Plan  Continue home pain regimen    Insomnia  Assessment & Plan  Trazodone    Asthma  Assessment & Plan  Not in acute exacerbation  -Continue home meds/inhalers         VTE prophylaxis:   SCDs/TEDs      I have performed a physical exam and reviewed and updated ROS and Plan today (8/1/2023). In review of yesterday's note (7/31/2023), there are no changes except as documented above.

## 2023-08-01 NOTE — ANESTHESIA TIME REPORT
Anesthesia Start and Stop Event Times     Date Time Event    8/1/2023 0957 Ready for Procedure     1017 Anesthesia Start     1100 Anesthesia Stop        Responsible Staff  08/01/23    Name Role Begin End    Abel Redd M.D. Anesth 1017 1100        Overtime Reason:  no overtime (within assigned shift)    Comments:

## 2023-08-01 NOTE — H&P
Hospital Medicine History & Physical Note    Date of Service  7/31/2023    Primary Care Physician  No primary care provider on file.    Consultants  Surgery (Dr. Jones)    Code Status  Full Code    Chief Complaint  No chief complaint on file.  Abdominal pain    History of Presenting Illness  Marilu New is a 33 y.o. female with history of asthma, chronic pain syndrome who presented 7/31/2023 with evaluation for cholecystitis, possible choledocholithiasis.    Work-up for [facility] ER included:  CTAP with contrast: No acute abdominal pelvic abnormality  Ultrasound abdomen limited: Cholelithiasis with positive Lopez sign suggesting cholecystitis.  7 mm dilated mildly CBD.  Downstream obstruction or choledocholithiasis may be present.  Consider MRCP    CBC, WBC 7.0, hemoglobin 15.3, platelet 300  CMP: Sodium 141, potassium 3.5, chloride 107, bicarb 24, alkaline phosphatase 81, AST 19, ALT 32, BUN 7, glucose 101, creatinine 0.69, calcium 9.3, total protein 7.1, albumin 4.3, total bilirubin 0.5  Lactic acid 2.90  Lipase 13    Ultimately, patient was transferred to [facility] for higher level of care.  Patient reported having biliary colic complaint as of last Friday.  She went to the emergency room, noted to have gallstones but no admitted and discharged with pain control.  She returned to ER earlier today due to recurrent of abdominal pain.  CTAP and right upper quadrant ultrasound as above.  Transferred to [facility] for possible MRCP and higher level of care.  Patient was seen by me at [facility] arrival, admitted to medicine service for further evaluation and treatment.    I discussed the plan of care with patient, bedside RN, pharmacy, and general surgery.    Review of Systems  Review of Systems   Constitutional:  Positive for chills and malaise/fatigue. Negative for fever.   HENT:  Negative for hearing loss and tinnitus.    Eyes:  Negative for blurred vision and double vision.   Respiratory:  Negative for  cough and shortness of breath.    Cardiovascular:  Negative for chest pain and palpitations.   Gastrointestinal:  Positive for abdominal pain, heartburn, nausea and vomiting. Negative for blood in stool.   Genitourinary:  Negative for dysuria and hematuria.   Musculoskeletal:  Negative for joint pain and myalgias.   Skin:  Negative for itching and rash.   Neurological:  Positive for weakness. Negative for dizziness and headaches.   Endo/Heme/Allergies:  Negative for environmental allergies. Does not bruise/bleed easily.   Psychiatric/Behavioral:  Negative for depression and substance abuse.    All other systems reviewed and are negative.      Past Medical History   has no past medical history on file.    Surgical History   has no past surgical history on file.   Gastric sleeve 2023      Family History  family history is not on file.   Family history reviewed with patient. There is family history that is pertinent to the chief complaint.   FH cholelithiasis, choledocholithiasis    Social History   Denies tobacco smoking, denies EtOH abuse    Allergies  Allergies   Allergen Reactions    Amoxicillin Shortness of Breath and Rash     chest    Levaquin [Levofloxacin] Shortness of Breath and Rash     chest    Sulfa Drugs Shortness of Breath and Rash     chest       Medications  None       Physical Exam  Temp:  [36.7 °C (98.1 °F)] 36.7 °C (98.1 °F)  Pulse:  [51] 51  Resp:  [18] 18  BP: (108)/(73) 108/73  SpO2:  [92 %] 92 %  Blood Pressure: 108/73   Temperature: 36.7 °C (98.1 °F)   Pulse: (!) 51   Respiration: 18   Pulse Oximetry: 92 %       Physical Exam  Vitals and nursing note reviewed.   Constitutional:       General: She is not in acute distress.  HENT:      Head: Normocephalic and atraumatic.      Nose: Nose normal.      Mouth/Throat:      Mouth: Mucous membranes are dry.      Pharynx: Oropharynx is clear.   Eyes:      General: No scleral icterus.     Extraocular Movements: Extraocular movements  intact.   Cardiovascular:      Rate and Rhythm: Normal rate and regular rhythm.      Pulses: Normal pulses.      Heart sounds:      No friction rub.   Pulmonary:      Effort: No respiratory distress.      Breath sounds: No wheezing or rales.   Chest:      Chest wall: No tenderness.   Abdominal:      General: There is distension.      Tenderness: There is abdominal tenderness. There is no guarding or rebound.   Musculoskeletal:         General: No swelling or tenderness. Normal range of motion.      Cervical back: Neck supple. No tenderness.   Skin:     General: Skin is warm and dry.      Capillary Refill: Capillary refill takes less than 2 seconds.   Neurological:      General: No focal deficit present.      Mental Status: She is alert and oriented to person, place, and time.   Psychiatric:         Mood and Affect: Mood normal.         Laboratory:  Recent Labs     07/31/23  2333   WBC 7.4   RBC 4.45   HEMOGLOBIN 13.3   HEMATOCRIT 39.6   MCV 89.0   MCH 29.9   MCHC 33.6   RDW 39.2   PLATELETCT 251   MPV 9.5     Recent Labs     07/31/23  2333   SODIUM 141   POTASSIUM 3.4*   CHLORIDE 106   CO2 25   GLUCOSE 96   BUN 6*   CREATININE 0.51   CALCIUM 8.5     Recent Labs     07/31/23  2333   ALTSGPT 39   ASTSGOT 28   ALKPHOSPHAT 71   TBILIRUBIN 0.5   LIPASE 12   GLUCOSE 96         No results for input(s): NTPROBNP in the last 72 hours.      No results for input(s): TROPONINT in the last 72 hours.    Imaging:  OUTSIDE IMAGES-US ABDOMEN   Final Result      OUTSIDE IMAGES-US ABDOMEN   Final Result      OUTSIDE IMAGES-CT ABDOMEN /PELVIS   Final Result      OUTSIDE IMAGES-CT ABDOMEN /PELVIS   Final Result          no X-Ray or EKG requiring interpretation    Assessment/Plan:  Justification for Admission Status  I anticipate this patient will require at least 2 midnights hospitalization, therefore appropriate for inpatient status.      * Cholecystitis  Assessment & Plan  Sonographic Lopez and 7mm CBD on RUQ    IVF  Supportive pain  control  Abx: Flagyl (multiple abx allergy)  Follow cultures    Surgery f/u appreciated - to OR in AM    Abdominal pain  Assessment & Plan  Due to above    Choledocholithiasis  Assessment & Plan  Suspected  Planned for cholecystectomy with IOC  MRCP canceled    Intractable nausea and vomiting  Assessment & Plan  IVF  Support antiemetic  IVF Pepcid    Asthma  Assessment & Plan  Not in acute exacerbation  Home meds    Hypokalemia  Assessment & Plan  replace    Chronic pain syndrome  Assessment & Plan  Continue home pain regimen    Insomnia  Assessment & Plan  Trazodone        VTE prophylaxis: SCDs/TEDs

## 2023-08-01 NOTE — ASSESSMENT & PLAN NOTE
Sonographic Lopez and 7mm CBD on RUQ US, s/p lap golden w/ cholangiograph on 8/1  -Supportive pain control  -Stop abx  -Follow cultures  -Surgery following, will appreciate recs

## 2023-08-01 NOTE — CONSULTS
DATE OF CONSULTATION:  2023     REFERRING PHYSICIAN:   Patricio Treviño M.D.     CONSULTING PHYSICIAN:  Leonel Jones M.D.     REASON FOR CONSULTATION:  I have been asked by  to see the patient in surgical consultation for evaluation of choledocholithiasis.    HISTORY OF PRESENT ILLNESS: The patient is a 33 year-old  woman who was transferred from [facility] for concern for choledocholithiasis. She endorses a three - day history of severe epigastric and right upper quadrant  abdominal pain. The pain is associated with nausea, vomiting, and malaise. The pain became worse yesterday so she sought evaluation at her local emergency room. The patient denies any recent or intercurrent illness. The patient has undergone sleeve gastrectomy. The patient denies any previous surgery for obstructive symptoms..     PAST MEDICAL HISTORY: obesity    PAST SURGICAL HISTORY: sleeve gastrectomy,  section x3    ALLERGIES:   Allergies   Allergen Reactions    Amoxicillin Shortness of Breath and Rash     chest    Levaquin [Levofloxacin] Shortness of Breath and Rash     chest    Sulfa Drugs Shortness of Breath and Rash     chest       CURRENT MEDICATIONS:    Home Medications       Reviewed by Shanice Frost R.N. (Registered Nurse) on 23 at 2316  Med List Status: Complete     Medication Last Dose Status   albuterol 108 (90 Base) MCG/ACT Aero Soln inhalation aerosol seasonal Active   buPROPion (WELLBUTRIN) 100 MG Tab one month ago Active   fexofenadine (ALLEGRA) 60 MG Tab 2023 Active   gabapentin (NEURONTIN) 100 MG Cap 2023 Active   methocarbamol (ROBAXIN) 500 MG Tab 2023 Active   montelukast (SINGULAIR) 10 MG Tab 2023 Active   oxyCODONE immediate-release (ROXICODONE) 5 MG Tab 2023 Active   traZODone (DESYREL) 50 MG Tab 2023 Active                    FAMILY HISTORY: non-contributory    SOCIAL HISTORY: denies alcohol, tobacco, and illicit drug use    REVIEW OF SYSTEMS:  Comprehensive review of systems is negative with the exception of the aforementioned HPI, PMH, and PSH bullets in accordance with CMS guidelines.    PHYSICAL EXAMINATION:    Physical Exam  Vitals and nursing note reviewed.   Constitutional:       General: She is not in acute distress.     Appearance: She is not toxic-appearing.   HENT:      Head: Normocephalic and atraumatic.      Right Ear: External ear normal.      Left Ear: External ear normal.      Nose: Nose normal.      Mouth/Throat:      Mouth: Mucous membranes are moist.      Pharynx: Oropharynx is clear.   Eyes:      General: No scleral icterus.     Pupils: Pupils are equal, round, and reactive to light.   Cardiovascular:      Rate and Rhythm: Normal rate and regular rhythm.   Pulmonary:      Effort: Pulmonary effort is normal. No respiratory distress.   Abdominal:      General: There is no distension.      Palpations: Abdomen is soft.      Tenderness: There is abdominal tenderness in the right upper quadrant. There is no guarding or rebound. Negative signs include Lopez's sign.      Comments: Well-healed port-site and Pfannenstiel incisions.   Genitourinary:     Comments: Deferred.  Musculoskeletal:         General: No tenderness or deformity.      Cervical back: Normal range of motion and neck supple.   Skin:     General: Skin is warm and dry.      Capillary Refill: Capillary refill takes less than 2 seconds.      Coloration: Skin is not jaundiced.   Neurological:      General: No focal deficit present.      Mental Status: She is alert and oriented to person, place, and time.   Psychiatric:         Behavior: Behavior is cooperative.         LABORATORY VALUES:   Recent Labs     07/31/23  2333   WBC 7.4   RBC 4.45   HEMOGLOBIN 13.3   HEMATOCRIT 39.6   MCV 89.0   MCH 29.9   MCHC 33.6   RDW 39.2   PLATELETCT 251   MPV 9.5     Recent Labs     07/31/23  2333   SODIUM 141   POTASSIUM 3.4*   CHLORIDE 106   CO2 25   GLUCOSE 96   BUN 6*   CREATININE 0.51   CALCIUM  8.5     Recent Labs     07/31/23  2333   ASTSGOT 28   ALTSGPT 39   TBILIRUBIN 0.5   ALKPHOSPHAT 71   GLOBULIN 2.3            IMAGING:   OUTSIDE IMAGES-US ABDOMEN   Final Result      OUTSIDE IMAGES-US ABDOMEN   Final Result      OUTSIDE IMAGES-CT ABDOMEN /PELVIS   Final Result      OUTSIDE IMAGES-CT ABDOMEN /PELVIS   Final Result          ASSESSMENT AND PLAN:     Choledocholithiasis  Assessment & Plan  Transferred from outside facility for concern for choledocholithiasis due to slightly enlarged CBD.  Three-day history of mostly right-upper quadrant pain, nausea, and vomiting.  Tender in the right-upper quadrant on exam, no rebound tenderness or guarding, no Lopez's sign.  Labs without leukocytosis or elevated LFTs.  Ultrasound with gallstones, CBD 6.7 mm,  gallbladder wall 2.3 mm.  Symptoms consistent with biliary colic, given mildly enlarged CBD will plan for laparoscopic cholecystectomy with intraoperative cholangiography.        DISPOSITION: Admit [facility].     ____________________________________     Leonel Jones M.D.    DD: 8/1/2023  12:25 AM    AAST Grading System for EGS Conditions  ACS NSQIP Surgical Risk Calculator

## 2023-08-01 NOTE — ASSESSMENT & PLAN NOTE
Transferred from outside facility for concern for choledocholithiasis due to slightly enlarged CBD.  Three-day history of mostly right-upper quadrant pain, nausea, and vomiting.  Tender in the right-upper quadrant on exam, no rebound tenderness or guarding, no Lopez's sign.  Labs without leukocytosis or elevated LFTs.  Ultrasound with gallstones, CBD 6.7 mm,  gallbladder wall 2.3 mm.  8/1 Lap golden  8/2 ERCP with stone removal  [facility] service

## 2023-08-01 NOTE — OR NURSING
"Cary Carrizales  Pt desats while asleep. Pt on 6L oxymask.   O2 sat jumps to 97 when awake. Pt shares w/ nurse that she has been having \" tests done because her oxygen goes down' when she sleeps.   Pt has been AXOX4. Easily aroused by normal voice.   "

## 2023-08-01 NOTE — CONSULTS
Date of Consultation:  8/1/2023    Patient: : Marilu Velarde  MRN: [id number]    Referring Physician:  Dr. Cheryl Saha and Dr. Mitch Deng     GI:Ignacio Armstrong M.D.     Reason for Consultation:   Choledocholithiasis.     History of Present Illness:   Patient is a 33 years old female without significant GI, liver medical history before presented to the hospital for abdominal pain more than 3 days.    Patient went to outside hospital for epigastric pain,  the possible Lopez sign ultrasound and the CAT scan show possible cholelithiasis and cholecystitis.  Common bile duct was 7 mm during that time.  No definite stone was found by the CAT scan and MRCP was recommended.  The patient's labs do not show normal total bilirubin of 0.5 and the AST ALT alkaline phosphatase are all within normal range.    On arrival the patient was seen by our surgeon, no the patient went to lap cholecystectomy today.  Surgery team performed an intraoperative cholangiogram, which show choledocholithiasis in the common bile duct.    GI team is consulted for possible ERCP.    GI team saw the patient at bedside after the surgery today.      No past medical history on file.      History reviewed. No pertinent surgical history.    History reviewed. No pertinent family history.             Physical Exam:  Vitals:    08/01/23 1058 08/01/23 1114 08/01/23 1119 08/01/23 1134   BP: 117/66 114/62 121/71 116/67   Pulse: (!) 58 72 68 63   Resp: 12 (!) 23 (!) 26 (!) 23   Temp: 36.6 °C (97.8 °F)      TempSrc: Temporal      SpO2: 99% 100% 98% 98%   Weight:       Height:                 Labs:  Recent Labs     07/31/23 2333 08/01/23  0507   WBC 7.4 5.7   RBC 4.45 4.27   HEMOGLOBIN 13.3 12.7   HEMATOCRIT 39.6 38.7   MCV 89.0 90.6   MCH 29.9 29.7   MCHC 33.6 32.8   RDW 39.2 40.8   PLATELETCT 251 243   MPV 9.5 9.6     Recent Labs     07/31/23 2333 08/01/23  0507   SODIUM 141 141   POTASSIUM 3.4* 3.9   CHLORIDE 106 108   CO2 25 25   GLUCOSE 96 89   BUN  6* 5*     Recent Labs     07/31/23  2333   INR 1.28*       Recent Labs     07/31/23  2333 08/01/23  0507   ASTSGOT 28 21   ALTSGPT 39 32   TBILIRUBIN 0.5 0.4   ALKPHOSPHAT 71 67   GLOBULIN 2.3 2.3   INR 1.28*  --          Imaging:  No image results found.            Impressions:  33 years old female presented to [facility] for acute cholecystitis s/p lap cholecystectomy 8/1/2023, intraoperative cholangiogram show choledocholithiasis in common bile duct.  GI team is consulted for ERCP.    Saw the patient at bedside, reviewed labs and also surgery finding with the patient.  We explained the benefit, risk and the indication of ERCP.    The GI team suggest midnight n.p.o., continue supportive care, fluid support, plan to have ERCP on Aug 2nd morning.          This note was generated using voice recognition software which has a small chance of producing errors of grammar and possibly content. I have made every reasonable attempt to find and correct any obvious errors, but expect that some may not be found prior to finalization of this note.

## 2023-08-01 NOTE — OP REPORT
DATE OF SERVICE:  08/01/2023     PREOPERATIVE DIAGNOSIS:  Acute cholecystitis with cholelithiasis and dilated   common duct.     POSTOPERATIVE DIAGNOSIS:  Choledocholithiasis.     PROCEDURE:  Laparoscopic cholecystectomy with intraoperative cholangiogram.     SURGEON:  Cheryl Saha MD     ASSISTANT:  DESMOND Dejesus.     ANESTHESIA:  General endotracheal.     ANESTHESIOLOGIST:  Abel Redd MD     INDICATIONS:   The patient is a 33-year-old female who denies any prior   history of having gallstones symptoms, but she had been having symptoms for   the last three days.  She subsequently was seen in [place] where an ultrasound   and CT scan showed cholelithiasis and dilated common duct, raising the   question for possible choledocholithiasis.  She is being brought to the   operating room at this time for laparoscopic cholecystectomy and   cholangiogram. The indications for a surgical assistant in this surgery were indicated due to complexity of the procedure. Their role included aiding in incision, retraction, holding devices including camera for laparoscopic procedure, and closure of the wound.        FINDINGS:  Single duct and an elongated right hepatic duct with small branches   to the gallbladder and the right hepatic artery was preserved.  The   cholangiogram did demonstrate no flow into the duodenum consistent with   probable choledocholithiasis.     DESCRIPTION OF PROCEDURE:  After the patient was identified and consented, she   was brought to the operating room and placed in supine position.  The patient   underwent general endotracheal anesthetic clearance.  The patient's abdomen   was prepped and draped in sterile fashion.  The periumbilical was anesthetized   with 0.25% Marcaine, 1 cm incision was made.  The abdominal wall was lifted   up, Veress needle was inserted into the abdominal cavity.  After positive drop   test, pneumoperitoneum was obtained.  Veress needle was removed.  A 5 mm   trocar was  placed.  Under laparoscopic guidance, a 10 mm trocar was placed in   the epigastric position and two 5 mm trocars placed in the right subcostal   position.  The gallbladder was lifted up.  The duct, artery and surrounding   tissues were doubly clipped and transected.  Prior to doing that, the duct was   clipped proximally and a ductotomy was performed. Cholangiogram was performed   and demonstrated the aforementioned findings and the duct was doubly clipped   and transected as was the arterial branches.  Gallbladder was incised from the   liver bed using electrocautery, was entered in the process of excision, was   brought through the epigastric port.  Liver bed was irrigated and hemostasis   occurred.  Port sites removed and pneumoperitoneum was released.  Port sites   were closed with 4-0 Vicryls.  Op-Site dressing placed on the wounds.  The   patient was extubated and taken to recovery room in stable condition.  All   sponge and needle counts were correct.        ______________________________  MD SADIQ AGUILA/JED//    DD:  08/01/2023 10:52  DT:  08/01/2023 11:52    Job#:  [id number]    CC:Abel Redd MD

## 2023-08-01 NOTE — CARE PLAN
The patient is Stable - Low risk of patient condition declining or worsening    Shift Goals  Clinical Goals: pain control, nausea  Patient Goals: surgery    Progress made toward(s) clinical / shift goals:    Problem: Knowledge Deficit - Standard  Goal: Patient and family/care givers will demonstrate understanding of plan of care, disease process/condition, diagnostic tests and medications  Description: Target End Date:  1-3 days or as soon as patient condition allows    Document in Patient Education    1.  Patient and family/caregiver oriented to unit, equipment, visitation policy and means for communicating concern  2.  Complete/review Learning Assessment  3.  Assess knowledge level of disease process/condition, treatment plan, diagnostic tests and medications  4.  Explain disease process/condition, treatment plan, diagnostic tests and medications  Outcome: Progressing     Problem: Pain - Standard  Goal: Alleviation of pain or a reduction in pain to the patient’s comfort goal  Description: Target End Date:  Prior to discharge or change in level of care    Document on Vitals flowsheet    1.  Document pain using the appropriate pain scale per order or unit policy  2.  Educate and implement non-pharmacologic comfort measures (i.e. relaxation, distraction, massage, cold/heat therapy, etc.)  3.  Pain management medications as ordered  4.  Reassess pain after pain med administration per policy  5.  If opiods administered assess patient's response to pain medication is appropriate per POSS sedation scale  6.  Follow pain management plan developed in collaboration with patient and interdisciplinary team (including palliative care or pain specialists if applicable)  Outcome: Progressing     Problem: Respiratory  Goal: Patient will achieve/maintain optimum respiratory ventilation and gas exchange  Description: Target End Date:  Prior to discharge or change in level of care    Document on Assessment flowsheet    1.  Assess  and monitor rate, rhythm, depth and effort of respiration  2.  Breath sounds assessed qshift and/or as needed  3.  Assess O2 saturation, administer/titrate oxygen as ordered  4.  Position patient for maximum ventilatory efficiency  5.  Turn, cough, and deep breath with splinting to improve effectiveness  6.  Collaborate with RT to administer medication/treatments per order  7.  Encourage use of incentive spirometer and encourage patient to cough after use and utilize splinting techniques if applicable  8.  Airway suctioning  9.  Monitor sputum production for changes in color, consistency and frequency  10. Perform frequent oral hygiene  11. Alternate physical activity with rest periods  Outcome: Progressing       Patient is not progressing towards the following goals:

## 2023-08-01 NOTE — PROGRESS NOTES
4 Eyes Skin Assessment Completed by NANCY Prasad and NANCY Braga.    Head WDL  Ears WDL  Nose WDL  Mouth WDL  Neck WDL  Breast/Chest WDL  Shoulder Blades WDL  Spine WDL  (R) Arm/Elbow/Hand WDL  (L) Arm/Elbow/Hand WDL  Abdomen WDL  Groin WDL  Scrotum/Coccyx/Buttocks WDL  (R) Leg Bruising  (L) Leg Bruising  (R) Heel/Foot/Toe Bruising  (L) Heel/Foot/Toe Bruising          Devices In Places Pulse Ox      Interventions In Place Pillows and Pressure Redistribution Mattress    Possible Skin Injury No    Pictures Uploaded Into Epic N/A  Wound Consult Placed N/A  RN Wound Prevention Protocol Ordered No

## 2023-08-01 NOTE — PROGRESS NOTES
Bedside report received.  Assessment complete.  A&O x 4. Patient calls appropriately.  Patient ambulates with standby assist. Bed alarm off.   Patient has 7/10 pain. Pain managed with prescribed medications.  Denies N&V. Tolerating NPO with sips diet.  + void, + flatus, - BM. Last BM 7/31  Patient denies SOB.  SCD's refused.  Review plan with of care with patient. Call light and personal belongings within reach. Hourly rounding in place. All needs met at this time.     Patient off the floor to pre-op with transport via hospital bed on 2L NC.

## 2023-08-01 NOTE — ASSESSMENT & PLAN NOTE
Possible NOLAN, desated while asleep post-op, was evaluated as outpt w/ sleep study but states provider moved before she could see her for results/tx  -Outpt f/u

## 2023-08-01 NOTE — OR NURSING
Contacted Dr Redd. Pt c/o surgical pain 8-9/10 after fentanyl dilaudid and oxycodone.  Dr Redd orders robaxin. Order repeated and placed into Epic.

## 2023-08-01 NOTE — OR NURSING
Had pt do inspirometer . Gets up to 9345-5870 cc.  Pt dangled. Ambulated  to BR and voided. Gait steady. Denies SOB. Pain tolerable.   Return to bed.     1335 Marilu Hoff updated regarding above.

## 2023-08-01 NOTE — OR NURSING
Surgical pain greatly reduced w/ robaxin. Pt is able to rest. Calm. Quiet breathing. Rates 5-6/10. Tolerating sips juice.

## 2023-08-01 NOTE — ANESTHESIA PREPROCEDURE EVALUATION
Case: [id number] Anesthesia Start Date/Time: 08/01/23 1017    Procedures:       CHOLECYSTECTOMY, LAPAROSCOPIC (Abdomen)      CHOLANGIOGRAM (Abdomen)    Anesthesia type: general    Pre-op diagnosis: ACUTE CHOLECYSTITIS    Location: [facility] OR 10 / SURGERY [facility]    Surgeons: Cheryl Saha M.D.      Acute cholecystitis    Relevant Problems   PULMONARY   (positive) Asthma       Physical Exam    Airway   Mallampati: II  TM distance: >3 FB  Neck ROM: full       Cardiovascular - normal exam  Rhythm: regular  Rate: normal  (-) murmur     Dental - normal exam           Pulmonary - normal exam  Breath sounds clear to auscultation     Abdominal    Neurological - normal exam                 Anesthesia Plan    ASA 2       Plan - general       Airway plan will be ETT          Induction: intravenous    Postoperative Plan: Postoperative administration of opioids is intended.    Pertinent diagnostic labs and testing reviewed    Informed Consent:    Anesthetic plan and risks discussed with patient.

## 2023-08-01 NOTE — ANESTHESIA POSTPROCEDURE EVALUATION
Patient: Marilu Velarde    Procedure Summary     Date: 08/01/23 Room / Location: [place] [address] / SURGERY [facility]    Anesthesia Start: 1017 Anesthesia Stop: 1100    Procedures:       CHOLECYSTECTOMY, LAPAROSCOPIC (Abdomen)      CHOLANGIOGRAM (Abdomen) Diagnosis: (CHOLEDOCHOLITHIASIS)    Surgeons: Cheryl Saha M.D. Responsible Provider: Abel Redd M.D.    Anesthesia Type: general ASA Status: 2          Final Anesthesia Type: general  Last vitals  BP   Blood Pressure: 116/67    Temp   36.6 °C (97.8 °F)    Pulse   63   Resp   (!) 23    SpO2   98 %      Anesthesia Post Evaluation    Patient location during evaluation: PACU  Level of consciousness: awake and alert    Airway patency: patent  Anesthetic complications: no  Cardiovascular status: hemodynamically stable  Respiratory status: acceptable  Hydration status: euvolemic    PONV: none          No notable events documented.     Nurse Pain Score: 8 (NPRS)

## 2023-08-01 NOTE — ANESTHESIA PROCEDURE NOTES
Airway    Date/Time: 8/1/2023 10:20 AM    Performed by: Abel Redd M.D.  Authorized by: Abel Redd M.D.    Location:  OR  Urgency:  Elective  Difficult Airway: No    Indications for Airway Management:  Anesthesia      Spontaneous Ventilation: absent    Sedation Level:  Deep  Preoxygenated: Yes    Patient Position:  Sniffing  Final Airway Type:  Endotracheal airway  Final Endotracheal Airway:  ETT  Cuffed: Yes    Technique Used for Successful ETT Placement:  Direct laryngoscopy  Devices/Methods Used in Placement:  Intubating stylet    Insertion Site:  Oral  Blade Type:  Lili  Laryngoscope Blade/Videolaryngoscope Blade Size:  3  ETT Size (mm):  7.0  Measured from:  Teeth  ETT to Teeth (cm):  21  Placement Verified by: auscultation and capnometry    Cormack-Lehane Classification:  Grade I - full view of glottis  Number of Attempts at Approach:  1

## 2023-08-01 NOTE — ASSESSMENT & PLAN NOTE
s/p lap golden w/ cholangiograph on 8/1 and ERCP w/ stent on 8/2  -Follow up in 1 month for abdominal XR to confirm stent migration, if not will need EGD retrieval

## 2023-08-01 NOTE — OR NURSING
Report to Sofie  4 sites. Local given. Rec'd oxycodone fentanyl dilaudid and robaxin. Pain tolerable.  Pt very sleepy and desats occasionally. Will hold in pacu until ready to go to room

## 2023-08-02 ENCOUNTER — ANESTHESIA (OUTPATIENT)
Dept: SURGERY | Facility: MEDICAL CENTER | Age: 33
DRG: 419 | End: 2023-08-02
Payer: COMMERCIAL

## 2023-08-02 ENCOUNTER — ANESTHESIA EVENT (OUTPATIENT)
Dept: SURGERY | Facility: MEDICAL CENTER | Age: 33
DRG: 419 | End: 2023-08-02
Payer: COMMERCIAL

## 2023-08-02 ENCOUNTER — APPOINTMENT (OUTPATIENT)
Dept: RADIOLOGY | Facility: MEDICAL CENTER | Age: 33
DRG: 419 | End: 2023-08-02
Attending: INTERNAL MEDICINE
Payer: COMMERCIAL

## 2023-08-02 ENCOUNTER — HOSPITAL ENCOUNTER (OUTPATIENT)
Dept: RADIOLOGY | Facility: MEDICAL CENTER | Age: 33
End: 2023-08-02

## 2023-08-02 PROBLEM — E87.6 HYPOKALEMIA: Status: RESOLVED | Noted: 2023-08-01 | Resolved: 2023-08-02

## 2023-08-02 PROCEDURE — 700111 HCHG RX REV CODE 636 W/ 250 OVERRIDE (IP): Performed by: STUDENT IN AN ORGANIZED HEALTH CARE EDUCATION/TRAINING PROGRAM

## 2023-08-02 PROCEDURE — 700111 HCHG RX REV CODE 636 W/ 250 OVERRIDE (IP): Mod: JZ | Performed by: STUDENT IN AN ORGANIZED HEALTH CARE EDUCATION/TRAINING PROGRAM

## 2023-08-02 PROCEDURE — 700105 HCHG RX REV CODE 258: Performed by: ANESTHESIOLOGY

## 2023-08-02 PROCEDURE — 0FCD8ZZ EXTIRPATION OF MATTER FROM PANCREATIC DUCT, VIA NATURAL OR ARTIFICIAL OPENING ENDOSCOPIC: ICD-10-PCS | Performed by: INTERNAL MEDICINE

## 2023-08-02 PROCEDURE — 700111 HCHG RX REV CODE 636 W/ 250 OVERRIDE (IP): Performed by: ANESTHESIOLOGY

## 2023-08-02 PROCEDURE — 99024 POSTOP FOLLOW-UP VISIT: CPT | Performed by: NURSE PRACTITIONER

## 2023-08-02 PROCEDURE — C1889 IMPLANT/INSERT DEVICE, NOC: HCPCS | Performed by: INTERNAL MEDICINE

## 2023-08-02 PROCEDURE — 160035 HCHG PACU - 1ST 60 MINS PHASE I: Performed by: INTERNAL MEDICINE

## 2023-08-02 PROCEDURE — 700102 HCHG RX REV CODE 250 W/ 637 OVERRIDE(OP): Performed by: STUDENT IN AN ORGANIZED HEALTH CARE EDUCATION/TRAINING PROGRAM

## 2023-08-02 PROCEDURE — 160009 HCHG ANES TIME/MIN: Performed by: INTERNAL MEDICINE

## 2023-08-02 PROCEDURE — 43274 ERCP DUCT STENT PLACEMENT: CPT | Mod: 59 | Performed by: INTERNAL MEDICINE

## 2023-08-02 PROCEDURE — 700117 HCHG RX CONTRAST REV CODE 255: Performed by: INTERNAL MEDICINE

## 2023-08-02 PROCEDURE — 770001 HCHG ROOM/CARE - MED/SURG/GYN PRIV*

## 2023-08-02 PROCEDURE — 700111 HCHG RX REV CODE 636 W/ 250 OVERRIDE (IP): Mod: JW | Performed by: SURGERY

## 2023-08-02 PROCEDURE — 160029 HCHG SURGERY MINUTES - 1ST 30 MINS LEVEL 4: Performed by: INTERNAL MEDICINE

## 2023-08-02 PROCEDURE — 43264 ERCP REMOVE DUCT CALCULI: CPT | Performed by: INTERNAL MEDICINE

## 2023-08-02 PROCEDURE — A9270 NON-COVERED ITEM OR SERVICE: HCPCS | Performed by: STUDENT IN AN ORGANIZED HEALTH CARE EDUCATION/TRAINING PROGRAM

## 2023-08-02 PROCEDURE — 700101 HCHG RX REV CODE 250: Performed by: ANESTHESIOLOGY

## 2023-08-02 PROCEDURE — C1769 GUIDE WIRE: HCPCS | Performed by: INTERNAL MEDICINE

## 2023-08-02 PROCEDURE — 160002 HCHG RECOVERY MINUTES (STAT): Performed by: INTERNAL MEDICINE

## 2023-08-02 PROCEDURE — 0F7D8DZ DILATION OF PANCREATIC DUCT WITH INTRALUMINAL DEVICE, VIA NATURAL OR ARTIFICIAL OPENING ENDOSCOPIC: ICD-10-PCS | Performed by: INTERNAL MEDICINE

## 2023-08-02 PROCEDURE — 160048 HCHG OR STATISTICAL LEVEL 1-5: Performed by: INTERNAL MEDICINE

## 2023-08-02 PROCEDURE — 43262 ENDO CHOLANGIOPANCREATOGRAPH: CPT | Performed by: INTERNAL MEDICINE

## 2023-08-02 PROCEDURE — 99231 SBSQ HOSP IP/OBS SF/LOW 25: CPT | Performed by: STUDENT IN AN ORGANIZED HEALTH CARE EDUCATION/TRAINING PROGRAM

## 2023-08-02 PROCEDURE — C2617 STENT, NON-COR, TEM W/O DEL: HCPCS | Performed by: INTERNAL MEDICINE

## 2023-08-02 PROCEDURE — 160041 HCHG SURGERY MINUTES - EA ADDL 1 MIN LEVEL 4: Performed by: INTERNAL MEDICINE

## 2023-08-02 DEVICE — IMPLANTABLE DEVICE: Type: IMPLANTABLE DEVICE | Site: BILE DUCT | Status: FUNCTIONAL

## 2023-08-02 RX ORDER — ENOXAPARIN SODIUM 100 MG/ML
40 INJECTION SUBCUTANEOUS DAILY
Status: DISCONTINUED | OUTPATIENT
Start: 2023-08-02 | End: 2023-08-03 | Stop reason: HOSPADM

## 2023-08-02 RX ORDER — MEPERIDINE HYDROCHLORIDE 25 MG/ML
12.5 INJECTION INTRAMUSCULAR; INTRAVENOUS; SUBCUTANEOUS
Status: DISCONTINUED | OUTPATIENT
Start: 2023-08-02 | End: 2023-08-02 | Stop reason: HOSPADM

## 2023-08-02 RX ORDER — HALOPERIDOL 5 MG/ML
1 INJECTION INTRAMUSCULAR
Status: DISCONTINUED | OUTPATIENT
Start: 2023-08-02 | End: 2023-08-02 | Stop reason: HOSPADM

## 2023-08-02 RX ORDER — SODIUM CHLORIDE, SODIUM LACTATE, POTASSIUM CHLORIDE, CALCIUM CHLORIDE 600; 310; 30; 20 MG/100ML; MG/100ML; MG/100ML; MG/100ML
INJECTION, SOLUTION INTRAVENOUS CONTINUOUS
Status: DISCONTINUED | OUTPATIENT
Start: 2023-08-02 | End: 2023-08-02 | Stop reason: HOSPADM

## 2023-08-02 RX ORDER — LIDOCAINE HYDROCHLORIDE 20 MG/ML
INJECTION, SOLUTION EPIDURAL; INFILTRATION; INTRACAUDAL; PERINEURAL PRN
Status: DISCONTINUED | OUTPATIENT
Start: 2023-08-02 | End: 2023-08-02 | Stop reason: SURG

## 2023-08-02 RX ORDER — SODIUM CHLORIDE, SODIUM LACTATE, POTASSIUM CHLORIDE, CALCIUM CHLORIDE 600; 310; 30; 20 MG/100ML; MG/100ML; MG/100ML; MG/100ML
INJECTION, SOLUTION INTRAVENOUS
Status: DISCONTINUED | OUTPATIENT
Start: 2023-08-02 | End: 2023-08-02 | Stop reason: SURG

## 2023-08-02 RX ORDER — ONDANSETRON 2 MG/ML
INJECTION INTRAMUSCULAR; INTRAVENOUS PRN
Status: DISCONTINUED | OUTPATIENT
Start: 2023-08-02 | End: 2023-08-02 | Stop reason: SURG

## 2023-08-02 RX ORDER — DEXAMETHASONE SODIUM PHOSPHATE 4 MG/ML
INJECTION, SOLUTION INTRA-ARTICULAR; INTRALESIONAL; INTRAMUSCULAR; INTRAVENOUS; SOFT TISSUE PRN
Status: DISCONTINUED | OUTPATIENT
Start: 2023-08-02 | End: 2023-08-02 | Stop reason: SURG

## 2023-08-02 RX ORDER — ONDANSETRON 2 MG/ML
4 INJECTION INTRAMUSCULAR; INTRAVENOUS
Status: DISCONTINUED | OUTPATIENT
Start: 2023-08-02 | End: 2023-08-02 | Stop reason: HOSPADM

## 2023-08-02 RX ORDER — LIDOCAINE HYDROCHLORIDE 40 MG/ML
SOLUTION TOPICAL PRN
Status: DISCONTINUED | OUTPATIENT
Start: 2023-08-02 | End: 2023-08-02 | Stop reason: SURG

## 2023-08-02 RX ADMIN — ACETAMINOPHEN 650 MG: 325 TABLET, FILM COATED ORAL at 04:01

## 2023-08-02 RX ADMIN — ACETAMINOPHEN 650 MG: 325 TABLET, FILM COATED ORAL at 00:07

## 2023-08-02 RX ADMIN — OXYCODONE HYDROCHLORIDE 10 MG: 10 TABLET ORAL at 13:47

## 2023-08-02 RX ADMIN — KETOROLAC TROMETHAMINE 15 MG: 30 INJECTION, SOLUTION INTRAMUSCULAR; INTRAVENOUS at 05:18

## 2023-08-02 RX ADMIN — KETOROLAC TROMETHAMINE 15 MG: 30 INJECTION, SOLUTION INTRAMUSCULAR; INTRAVENOUS at 18:03

## 2023-08-02 RX ADMIN — ENOXAPARIN SODIUM 40 MG: 100 INJECTION SUBCUTANEOUS at 18:03

## 2023-08-02 RX ADMIN — PROPOFOL 120 MG: 10 INJECTION, EMULSION INTRAVENOUS at 07:35

## 2023-08-02 RX ADMIN — FAMOTIDINE 20 MG: 20 TABLET, FILM COATED ORAL at 18:03

## 2023-08-02 RX ADMIN — FEXOFENADINE HCL 60 MG: 60 TABLET, FILM COATED ORAL at 04:01

## 2023-08-02 RX ADMIN — LIDOCAINE HYDROCHLORIDE 4 ML: 40 SOLUTION TOPICAL at 07:38

## 2023-08-02 RX ADMIN — SODIUM CHLORIDE, POTASSIUM CHLORIDE, SODIUM LACTATE AND CALCIUM CHLORIDE: 600; 310; 30; 20 INJECTION, SOLUTION INTRAVENOUS at 07:30

## 2023-08-02 RX ADMIN — KETOROLAC TROMETHAMINE 15 MG: 30 INJECTION, SOLUTION INTRAMUSCULAR; INTRAVENOUS at 00:07

## 2023-08-02 RX ADMIN — LIDOCAINE HYDROCHLORIDE 60 MG: 20 INJECTION, SOLUTION EPIDURAL; INFILTRATION; INTRACAUDAL at 07:35

## 2023-08-02 RX ADMIN — GLUCAGON 1 MG: 1 INJECTION, POWDER, LYOPHILIZED, FOR SOLUTION INTRAMUSCULAR; INTRAVENOUS at 07:54

## 2023-08-02 RX ADMIN — MIDAZOLAM 2 MG: 1 INJECTION, SOLUTION INTRAMUSCULAR; INTRAVENOUS at 07:31

## 2023-08-02 RX ADMIN — SENNOSIDES AND DOCUSATE SODIUM 2 TABLET: 50; 8.6 TABLET ORAL at 18:03

## 2023-08-02 RX ADMIN — SUGAMMADEX 200 MG: 100 INJECTION, SOLUTION INTRAVENOUS at 08:07

## 2023-08-02 RX ADMIN — OXYCODONE HYDROCHLORIDE 10 MG: 10 TABLET ORAL at 10:34

## 2023-08-02 RX ADMIN — DEXAMETHASONE SODIUM PHOSPHATE 4 MG: 4 INJECTION INTRA-ARTICULAR; INTRALESIONAL; INTRAMUSCULAR; INTRAVENOUS; SOFT TISSUE at 07:42

## 2023-08-02 RX ADMIN — KETOROLAC TROMETHAMINE 15 MG: 30 INJECTION, SOLUTION INTRAMUSCULAR; INTRAVENOUS at 12:37

## 2023-08-02 RX ADMIN — OXYCODONE HYDROCHLORIDE 10 MG: 10 TABLET ORAL at 21:25

## 2023-08-02 RX ADMIN — SENNOSIDES AND DOCUSATE SODIUM 2 TABLET: 50; 8.6 TABLET ORAL at 04:00

## 2023-08-02 RX ADMIN — OXYCODONE HYDROCHLORIDE 10 MG: 10 TABLET ORAL at 18:03

## 2023-08-02 RX ADMIN — GABAPENTIN 300 MG: 300 CAPSULE ORAL at 15:43

## 2023-08-02 RX ADMIN — GABAPENTIN 300 MG: 300 CAPSULE ORAL at 21:25

## 2023-08-02 RX ADMIN — CELECOXIB 200 MG: 200 CAPSULE ORAL at 04:01

## 2023-08-02 RX ADMIN — MONTELUKAST 10 MG: 10 TABLET, FILM COATED ORAL at 04:01

## 2023-08-02 RX ADMIN — FENTANYL CITRATE 100 MCG: 50 INJECTION, SOLUTION INTRAMUSCULAR; INTRAVENOUS at 07:35

## 2023-08-02 RX ADMIN — METRONIDAZOLE 500 MG: 500 INJECTION, SOLUTION INTRAVENOUS at 05:22

## 2023-08-02 RX ADMIN — ONDANSETRON 4 MG: 2 INJECTION INTRAMUSCULAR; INTRAVENOUS at 08:07

## 2023-08-02 RX ADMIN — TRAZODONE HYDROCHLORIDE 75 MG: 50 TABLET ORAL at 21:25

## 2023-08-02 RX ADMIN — ACETAMINOPHEN 650 MG: 325 TABLET, FILM COATED ORAL at 18:03

## 2023-08-02 RX ADMIN — LIDOCAINE HYDROCHLORIDE 40 MG: 20 INJECTION, SOLUTION EPIDURAL; INFILTRATION; INTRACAUDAL at 08:07

## 2023-08-02 RX ADMIN — ROCURONIUM BROMIDE 30 MG: 10 INJECTION, SOLUTION INTRAVENOUS at 07:35

## 2023-08-02 RX ADMIN — FAMOTIDINE 20 MG: 20 TABLET, FILM COATED ORAL at 04:01

## 2023-08-02 RX ADMIN — ACETAMINOPHEN 650 MG: 325 TABLET, FILM COATED ORAL at 12:37

## 2023-08-02 ASSESSMENT — COGNITIVE AND FUNCTIONAL STATUS - GENERAL
CLIMB 3 TO 5 STEPS WITH RAILING: A LITTLE
TURNING FROM BACK TO SIDE WHILE IN FLAT BAD: A LITTLE
DAILY ACTIVITIY SCORE: 23
SUGGESTED CMS G CODE MODIFIER DAILY ACTIVITY: CI
MOBILITY SCORE: 20
WALKING IN HOSPITAL ROOM: A LITTLE
TOILETING: A LITTLE
STANDING UP FROM CHAIR USING ARMS: A LITTLE
SUGGESTED CMS G CODE MODIFIER MOBILITY: CJ

## 2023-08-02 ASSESSMENT — ENCOUNTER SYMPTOMS
ABDOMINAL PAIN: 1
MYALGIAS: 0
NAUSEA: 0
FEVER: 0
CONSTIPATION: 0
VOMITING: 0
SENSORY CHANGE: 0
FOCAL WEAKNESS: 0
BLURRED VISION: 0
CHILLS: 0
DIARRHEA: 0
SHORTNESS OF BREATH: 0

## 2023-08-02 ASSESSMENT — PAIN DESCRIPTION - PAIN TYPE
TYPE: SURGICAL PAIN
TYPE: ACUTE PAIN
TYPE: SURGICAL PAIN
TYPE: ACUTE PAIN
TYPE: ACUTE PAIN
TYPE: SURGICAL PAIN

## 2023-08-02 ASSESSMENT — PAIN SCALES - GENERAL: PAIN_LEVEL: 4

## 2023-08-02 NOTE — PROGRESS NOTES
Assumed care of patient at 0700. Patient downstairs for procedure. Arrived to room at 0920. Patient ambulated to bed. Patient is alert and oriented, respirations are unlabored and regular on 1L02. Lung sounds clear throughout. Abdomen soft, tender, x4 lap sites with gauze/tegaderm, CDI, some drainage to umbilical dressing, +bowel sounds, pain stated at 7. Waiting void post surgery. Bed in lowest position, call light within reach, patient states no needs at this time.

## 2023-08-02 NOTE — ANESTHESIA POSTPROCEDURE EVALUATION
Patient: Marilu Velarde    Procedure Summary     Date: 08/02/23 Room / Location: [facility] ROOM 26 / SURGERY SAME DAY [facility]    Anesthesia Start: 0730 Anesthesia Stop: 0815    Procedures:       ERCP (ENDOSCOPIC RETROGRADE CHOLANGIOPANCREATOGRAPHY) (Esophagus)      SPHINCTEROTOMY (Esophagus)      ERCP, WITH CALCULUS REMOVAL FROM BILE OR PANCREATIC DUCT (Abdomen)      INSERTION, STENT, BILE DUCT (Esophagus) Diagnosis: (Choledocholithiasis, with stent placement)    Surgeons: Carlton Rivera M.D. Responsible Provider: Domonique Brady M.D.    Anesthesia Type: general ASA Status: 2          Final Anesthesia Type: general  Last vitals  BP   Blood Pressure: 121/79    Temp   36.6 °C (97.8 °F)    Pulse   (!) 52   Resp   15    SpO2   97 %      Anesthesia Post Evaluation    Patient location during evaluation: PACU  Patient participation: complete - patient participated  Level of consciousness: sleepy but conscious  Pain score: 4    Airway patency: patent  Anesthetic complications: no  Cardiovascular status: hemodynamically stable  Respiratory status: acceptable  Hydration status: acceptable    PONV: none          No notable events documented.     Nurse Pain Score: 4 (NPRS)

## 2023-08-02 NOTE — OR NURSING
0813 received patient from OR. Report from Anesthesiologist and OR RN. Patient on 4L at 4L % O2. VSS. Monitor connected. No airway in place. S/P ERCP.    0830 Patient awake complains of no pain or nausea. Patient tolerating sips of water well. MD Rivera at bedside talking to patient.     0817 Report given to floor NANCY Del Castillo. Patient placed on transport.     0908 [facility]

## 2023-08-02 NOTE — CARE PLAN
The patient is Stable - Low risk of patient condition declining or worsening    Shift Goals  Clinical Goals: pain control <4, ambulate hallway 50ft  Patient Goals: rest, pain control    Progress made toward(s) clinical / shift goals:  pain better controlled with medications, ambulating to bathroom    Patient is not progressing towards the following goals:      Problem: Knowledge Deficit - Standard  Goal: Patient and family/care givers will demonstrate understanding of plan of care, disease process/condition, diagnostic tests and medications  Outcome: Progressing     Problem: Pain - Standard  Goal: Alleviation of pain or a reduction in pain to the patient’s comfort goal  Outcome: Progressing

## 2023-08-02 NOTE — PROGRESS NOTES
"    DATE: 8/2/2023    POD # 1 - Lap golden  8/2 ERCP with stone removal and stent placement     INTERVAL EVENTS:  ERCP with stone removal this am  Clears for now - may advance this afternoon if tolerates   Mobilize     OK for VTE prophylaxis from surgical standpoint    PHYSICAL EXAMINATION:  Vital Signs: /81   Pulse (!) 57   Temp 36.7 °C (98.1 °F) (Temporal)   Resp 17   Ht 1.549 m (5' 1\")   Wt 70.6 kg (155 lb 10.3 oz)   SpO2 96%   Physical Exam  Vitals and nursing note reviewed.   Constitutional:       General: She is not in acute distress.     Appearance: She is not ill-appearing.   HENT:      Mouth/Throat:      Mouth: Mucous membranes are moist.      Pharynx: Oropharynx is clear.   Pulmonary:      Effort: Pulmonary effort is normal. No respiratory distress.   Abdominal:      Comments: Soft   Non distended  Tenderness with palpation RUQ at expected  Lap sites clear   Skin:     General: Skin is warm and dry.      Capillary Refill: Capillary refill takes less than 2 seconds.   Neurological:      Mental Status: She is alert and oriented to person, place, and time.   Psychiatric:         Behavior: Behavior normal.         Thought Content: Thought content normal.         Labs reviewed and Medications reviewed  Deluna catheter: No Deluna        DVT prophylaxis - mechanical: SCDs      Assessed for rehab: Patient returned to prior level of function, rehabilitation not indicated at this time      ASSESSMENT AND PLAN:   Choledocholithiasis  Assessment & Plan  Transferred from outside facility for concern for choledocholithiasis due to slightly enlarged CBD.  Three-day history of mostly right-upper quadrant pain, nausea, and vomiting.  Tender in the right-upper quadrant on exam, no rebound tenderness or guarding, no Lopez's sign.  Labs without leukocytosis or elevated LFTs.  Ultrasound with gallstones, CBD 6.7 mm,  gallbladder wall 2.3 mm.  8/1 Lap golden  8/2 ERCP with stone removal  [facility] " service    Discussed patient condition with RN, Patient, and Dr. Saha .

## 2023-08-02 NOTE — OP REPORT
OPERATIVE REPORT    PATIENT:   Marilu Velarde   1990       PREOPERATIVE DIAGNOSES/INDICATIONS: Choledocholithiasis, abnormal intraoperative cholangiogram, dilated common bile duct.    PROCEDURE: ERCP with biliary sphincterotomy and calculi removal, pancreatic duct stent placement    PHYSICIAN:  Carlton Rivera MD     ANESTHESIA:  Per anesthesiologist.  COURTNEY    LOCATION: [facility]    CONSENT: The risks, benefits and alternatives of the procedure were discussed in detail. The risks include and are not limited to bleeding, infection, perforation, missed lesions, and sedations risks (cardiopulmonary compromise and allergic reaction to medications).    DESCRIPTION:   The patient presented to the operating room.  A time out was performed prior to beginning the procedure.   The patient was placed in the supine position. Patient was sedated by anesthesia: GETA.    OPERATIVE FINDINGS:    Endoscope advanced under indirect visualization the second portion of the duodenum.  The ampulla was normal.  The pancreatic duct was cannulated.  The PD was of normal size at 3 mm.  A 4 Spanish by 5 cm single pigtail plastic pancreatic duct stent was deployed 5 cm into the duct.  Well positioned.  Using needle-knife technique a precut sphincterotomy was pursued.  Biliary access confirmed on cholangiogram.  The distal common bile duct was mildly irregular with a filling defect.  The cystic duct had been ligated from prior cholecystectomy.  There was no extravasation of contrast through the cystic duct.  The biliary sphincterotomy was extended with a standard sphincterotome.  1 cm sphincterotomy.  Multiple balloon sweeps from the bifurcation revealed 3 small cholesterol stones.  No stones remained.      Blood loss: Minimal    The patient tolerated the procedure well.      There were no immediate complications.    Pathology samples obtained: None    IMPRESSION:  Choledocholithiasis removed after biliary sphincterotomy.  Pancreatic duct  stent placed prior to precut sphincterotomy technique to gain access to the common bile duct.      RECOMMENDATIONS:  Watch for complications  Abdominal x-ray in 1 month to document spontaneous migration of PD stent.  If pancreatic duct stent remains in situ removed by EGD.  Clear liquid diet this morning.  May advance later this afternoon if tolerating clears.

## 2023-08-02 NOTE — ANESTHESIA TIME REPORT
Anesthesia Start and Stop Event Times     Date Time Event    8/2/2023 0715 Ready for Procedure     0730 Anesthesia Start     0815 Anesthesia Stop        Responsible Staff  08/02/23    Name Role Begin End    Domonique Brady M.D. Anesth 0730 0815        Overtime Reason:  no overtime (within assigned shift)    Comments:

## 2023-08-02 NOTE — CARE PLAN
Problem: Knowledge Deficit - Standard  Goal: Patient and family/care givers will demonstrate understanding of plan of care, disease process/condition, diagnostic tests and medications  8/2/2023 0036 by Heladio Hess, R.VONDA.  Outcome: Progressing  8/2/2023 0036 by Heladio Hess R.N.  Outcome: Progressing     Problem: Pain - Standard  Goal: Alleviation of pain or a reduction in pain to the patient’s comfort goal  Outcome: Progressing   The patient is Stable - Low risk of patient condition declining or worsening    Shift Goals  Clinical Goals: pain control, monitor N/V, ERCP in AM  Patient Goals: pain control, rest    Progress made toward(s) clinical / shift goals:  POC discussed with the patient and verbalized understanding, call bell within reach, pain controlled per PRN med, given enough rest and sleep.

## 2023-08-02 NOTE — ANESTHESIA PROCEDURE NOTES
Airway    Date/Time: 8/2/2023 7:38 AM    Performed by: Domonique Brady M.D.  Authorized by: Domonique Brady M.D.    Location:  OR  Urgency:  Elective  Indications for Airway Management:  Anesthesia      Spontaneous Ventilation: absent    Sedation Level:  Deep  Preoxygenated: Yes    Patient Position:  Sniffing  Mask Difficulty Assessment:  1 - vent by mask  Final Airway Type:  Endotracheal airway  Final Endotracheal Airway:  ETT  Cuffed: Yes    Technique Used for Successful ETT Placement:  Direct laryngoscopy  Devices/Methods Used in Placement:  Intubating stylet    Insertion Site:  Oral  Blade Type:  Lili  Laryngoscope Blade/Videolaryngoscope Blade Size:  3  ETT Size (mm):  7.0  Measured from:  Teeth  ETT to Teeth (cm):  21  Placement Verified by: auscultation and capnometry    Cormack-Lehane Classification:  Grade I - full view of glottis  Number of Attempts at Approach:  1

## 2023-08-03 ENCOUNTER — APPOINTMENT (OUTPATIENT)
Dept: RADIOLOGY | Facility: MEDICAL CENTER | Age: 33
DRG: 419 | End: 2023-08-03
Attending: STUDENT IN AN ORGANIZED HEALTH CARE EDUCATION/TRAINING PROGRAM
Payer: COMMERCIAL

## 2023-08-03 ENCOUNTER — PHARMACY VISIT (OUTPATIENT)
Dept: PHARMACY | Facility: MEDICAL CENTER | Age: 33
End: 2023-08-03
Payer: COMMERCIAL

## 2023-08-03 VITALS
RESPIRATION RATE: 17 BRPM | OXYGEN SATURATION: 98 % | HEART RATE: 50 BPM | TEMPERATURE: 98.2 F | DIASTOLIC BLOOD PRESSURE: 78 MMHG | BODY MASS INDEX: 29.39 KG/M2 | WEIGHT: 155.65 LBS | HEIGHT: 61 IN | SYSTOLIC BLOOD PRESSURE: 114 MMHG

## 2023-08-03 PROBLEM — K81.9 CHOLECYSTITIS: Status: RESOLVED | Noted: 2023-07-31 | Resolved: 2023-08-03

## 2023-08-03 PROBLEM — K80.50 CHOLEDOCHOLITHIASIS: Status: RESOLVED | Noted: 2023-08-01 | Resolved: 2023-08-03

## 2023-08-03 PROCEDURE — 99232 SBSQ HOSP IP/OBS MODERATE 35: CPT | Performed by: NURSE PRACTITIONER

## 2023-08-03 PROCEDURE — A9270 NON-COVERED ITEM OR SERVICE: HCPCS | Performed by: STUDENT IN AN ORGANIZED HEALTH CARE EDUCATION/TRAINING PROGRAM

## 2023-08-03 PROCEDURE — 700102 HCHG RX REV CODE 250 W/ 637 OVERRIDE(OP): Performed by: STUDENT IN AN ORGANIZED HEALTH CARE EDUCATION/TRAINING PROGRAM

## 2023-08-03 PROCEDURE — RXMED WILLOW AMBULATORY MEDICATION CHARGE: Performed by: STUDENT IN AN ORGANIZED HEALTH CARE EDUCATION/TRAINING PROGRAM

## 2023-08-03 PROCEDURE — 99024 POSTOP FOLLOW-UP VISIT: CPT | Performed by: NURSE PRACTITIONER

## 2023-08-03 PROCEDURE — 700111 HCHG RX REV CODE 636 W/ 250 OVERRIDE (IP): Mod: JZ | Performed by: STUDENT IN AN ORGANIZED HEALTH CARE EDUCATION/TRAINING PROGRAM

## 2023-08-03 PROCEDURE — 700111 HCHG RX REV CODE 636 W/ 250 OVERRIDE (IP): Performed by: SURGERY

## 2023-08-03 PROCEDURE — 99239 HOSP IP/OBS DSCHRG MGMT >30: CPT | Performed by: STUDENT IN AN ORGANIZED HEALTH CARE EDUCATION/TRAINING PROGRAM

## 2023-08-03 RX ORDER — OXYCODONE HYDROCHLORIDE 5 MG/1
5 TABLET ORAL EVERY 6 HOURS PRN
Qty: 16 TABLET | Refills: 0 | Status: SHIPPED | OUTPATIENT
Start: 2023-08-03 | End: 2023-08-07

## 2023-08-03 RX ADMIN — SENNOSIDES AND DOCUSATE SODIUM 2 TABLET: 50; 8.6 TABLET ORAL at 17:02

## 2023-08-03 RX ADMIN — KETOROLAC TROMETHAMINE 15 MG: 30 INJECTION, SOLUTION INTRAMUSCULAR; INTRAVENOUS at 08:23

## 2023-08-03 RX ADMIN — ACETAMINOPHEN 650 MG: 325 TABLET, FILM COATED ORAL at 12:03

## 2023-08-03 RX ADMIN — POLYETHYLENE GLYCOL 3350 1 PACKET: 17 POWDER, FOR SOLUTION ORAL at 05:09

## 2023-08-03 RX ADMIN — ACETAMINOPHEN 650 MG: 325 TABLET, FILM COATED ORAL at 17:02

## 2023-08-03 RX ADMIN — FEXOFENADINE HCL 60 MG: 60 TABLET, FILM COATED ORAL at 05:07

## 2023-08-03 RX ADMIN — ACETAMINOPHEN 650 MG: 325 TABLET, FILM COATED ORAL at 05:06

## 2023-08-03 RX ADMIN — MONTELUKAST 10 MG: 10 TABLET, FILM COATED ORAL at 05:07

## 2023-08-03 RX ADMIN — OXYCODONE HYDROCHLORIDE 10 MG: 10 TABLET ORAL at 08:22

## 2023-08-03 RX ADMIN — FAMOTIDINE 20 MG: 20 TABLET, FILM COATED ORAL at 05:07

## 2023-08-03 RX ADMIN — KETOROLAC TROMETHAMINE 15 MG: 30 INJECTION, SOLUTION INTRAMUSCULAR; INTRAVENOUS at 12:04

## 2023-08-03 RX ADMIN — OXYCODONE HYDROCHLORIDE 10 MG: 10 TABLET ORAL at 05:06

## 2023-08-03 RX ADMIN — ENOXAPARIN SODIUM 40 MG: 100 INJECTION SUBCUTANEOUS at 17:02

## 2023-08-03 RX ADMIN — SENNOSIDES AND DOCUSATE SODIUM 2 TABLET: 50; 8.6 TABLET ORAL at 05:07

## 2023-08-03 RX ADMIN — FAMOTIDINE 20 MG: 20 TABLET, FILM COATED ORAL at 17:02

## 2023-08-03 RX ADMIN — KETOROLAC TROMETHAMINE 15 MG: 30 INJECTION, SOLUTION INTRAMUSCULAR; INTRAVENOUS at 17:02

## 2023-08-03 RX ADMIN — GABAPENTIN 300 MG: 300 CAPSULE ORAL at 08:23

## 2023-08-03 ASSESSMENT — ENCOUNTER SYMPTOMS
CHILLS: 0
DIZZINESS: 0
ABDOMINAL PAIN: 1
BLURRED VISION: 0
HEARTBURN: 0
SHORTNESS OF BREATH: 0
BLOOD IN STOOL: 0
VOMITING: 0
COUGH: 0
DIARRHEA: 0
FEVER: 0
NAUSEA: 0
WEAKNESS: 0
DEPRESSION: 0
BACK PAIN: 0
CONSTIPATION: 0

## 2023-08-03 ASSESSMENT — PAIN DESCRIPTION - PAIN TYPE: TYPE: SURGICAL PAIN

## 2023-08-03 NOTE — PROGRESS NOTES
..Gastroenterology Progress Note               Author:  MICHELLE Pena Date & Time Created: 8/3/2023 7:55 AM       Patient ID:  Name:             Marilu Velarde  YOB: 1990  Age:                 33 y.o.  female  MRN:               [id number]        Medical Decision Making, by Problem:  Active Hospital Problems    Diagnosis     Asthma [J45.909]     Insomnia [G47.00]     Chronic pain syndrome [G89.4]     Choledocholithiasis [K80.50]     NOLAN (obstructive sleep apnea) [G47.33]     Cholecystitis [K81.9]            Presenting Chief Complaint:  Abdominal pain      Interval History:  8/3/2023: Patient seen. Tolerating small amounts of her diet. Minimal abdominal pain related to surgical sites.     8/2/2023: ERCP with Dr. Rivera  Choledocholithiasis removed after biliary sphincterotomy.  Pancreatic duct stent placed prior to precut sphincterotomy technique to gain access to the common bile duct.    8/1/2023: Lap golden with IOC with Dr. Saha    [facility] Medications:  Current Facility-Administered Medications   Medication Dose Frequency Provider Last Rate Last Admin    enoxaparin (Lovenox) inj 40 mg  40 mg DAILY AT 1800 Mithc Howe DMikaylaOMikayla   40 mg at 08/02/23 1803    ketorolac (Toradol) injection 15 mg  15 mg Q6HRS Cheryl Saha M.D.   15 mg at 08/02/23 1803    famotidine (Pepcid) tablet 20 mg  20 mg BID Mitch Howe D.OMikayla   20 mg at 08/03/23 0507    senna-docusate (Pericolace Or Senokot S) 8.6-50 MG per tablet 2 Tablet  2 Tablet BID Patricio Treviño M.D.   2 Tablet at 08/03/23 0507    And    polyethylene glycol/lytes (Miralax) PACKET 1 Packet  1 Packet QDAY PRN Patricio Treviño M.D.   1 Packet at 08/03/23 0509    And    bisacodyl (Dulcolax) suppository 10 mg  10 mg QDAY PRN Patricio Treviño M.D.        lactated ringers infusion (BOLUS)  500 mL Once PRN Patricio Treviño M.D.        ondansetron (Zofran) syringe/vial injection 4 mg  4 mg Q4HRS PRN Patricio Treviño M.D.   4 mg at 08/01/23 4371     ondansetron (Zofran ODT) dispertab 4 mg  4 mg Q4HRS PRN Patricio Treviño M.D.        promethazine (Phenergan) tablet 12.5-25 mg  12.5-25 mg Q4HRS PRN Patricio Treviño M.D.   25 mg at 08/01/23 1848    promethazine (Phenergan) suppository 12.5-25 mg  12.5-25 mg Q4HRS PRN Patricio Treviño M.D.        prochlorperazine (Compazine) injection 5-10 mg  5-10 mg Q4HRS PRN Patricio Treviño M.D.   5 mg at 08/01/23 2046    labetalol (Normodyne/Trandate) injection 10 mg  10 mg Q4HRS PRN Patricio Treviño M.D.        Pharmacy Consult Request ...Pain Management Review 1 Each  1 Each PHARMACY TO DOSE Patricio Treviño M.D.        acetaminophen (Tylenol) tablet 650 mg  650 mg Q6HRS Patricio Treviño M.D.   650 mg at 08/03/23 0506    Followed by    [START ON 8/6/2023] acetaminophen (Tylenol) tablet 650 mg  650 mg Q6HRS PRN Patricio Treviño M.D.        albuterol inhaler 2 Puff  2 Puff Q6HRS PRN Patricio Treviño M.D.        fexofenadine (Allegra) tablet 60 mg  60 mg DAILY Patricio Treviño M.D.   60 mg at 08/03/23 0507    gabapentin (Neurontin) capsule 300 mg  300 mg TID Patricio Treviño M.D.   300 mg at 08/02/23 2125    methocarbamol (Robaxin) tablet 500 mg  500 mg BID PRN Patricio Treviño M.D.        montelukast (Singulair) tablet 10 mg  10 mg DAILY Patricio Treviño M.D.   10 mg at 08/03/23 0507    traZODone (Desyrel) tablet 75 mg  75 mg Nightly Patricio Treviño M.D.   75 mg at 08/02/23 2125    oxyCODONE immediate-release (Roxicodone) tablet 5 mg  5 mg Q3HRS PRN Patricio Treviño M.D.        Or    oxyCODONE immediate release (Roxicodone) tablet 10 mg  10 mg Q3HRS PRN Patricio Treviño M.D.   10 mg at 08/03/23 0506    Or    morphine 4 MG/ML injection 4 mg  4 mg Q3HRS PRN Patricio Treviño M.D.   4 mg at 08/01/23 2046   Last reviewed on 8/2/2023  1:22 PM by Magdalene Lawton R.N.       Review of Systems:  Review of Systems   Constitutional:  Negative for chills, fever and malaise/fatigue.   HENT:  Negative for hearing loss.    Eyes:  Negative for blurred vision.   Respiratory:  Negative  "for cough and shortness of breath.    Cardiovascular:  Negative for chest pain and leg swelling.   Gastrointestinal:  Positive for abdominal pain. Negative for blood in stool, constipation, diarrhea, heartburn, melena, nausea and vomiting.   Genitourinary:  Negative for dysuria.   Musculoskeletal:  Negative for back pain.   Skin:  Negative for rash.   Neurological:  Negative for dizziness and weakness.   Psychiatric/Behavioral:  Negative for depression.    All other systems reviewed and are negative.        Vital signs:  Weight/BMI: Body mass index is 29.41 kg/m².  /73   Pulse 71   Temp 36.4 °C (97.5 °F) (Temporal)   Resp 17   Ht 1.549 m (5' 1\")   Wt 70.6 kg (155 lb 10.3 oz)   SpO2 96%   Vitals:    08/02/23 1050 08/02/23 1525 08/02/23 1932 08/03/23 0451   BP: 113/75 100/65 117/71 112/73   Pulse: 68 (!) 58 (!) 55 71   Resp: 17 17 17 17   Temp: 36.7 °C (98.1 °F) 36.8 °C (98.2 °F) 37.2 °C (99 °F) 36.4 °C (97.5 °F)   TempSrc: Temporal Temporal Temporal Temporal   SpO2: 98% 91% 98% 96%   Weight:       Height:         Oxygen Therapy:  Pulse Oximetry: 96 %, O2 (LPM): 1, O2 Delivery Device: Nasal Cannula    Intake/Output Summary (Last 24 hours) at 8/3/2023 0755  Last data filed at 8/2/2023 1725  Gross per 24 hour   Intake 740 ml   Output 1 ml   Net 739 ml         Physical Exam:  Physical Exam  Vitals and nursing note reviewed.   Constitutional:       General: She is not in acute distress.     Appearance: Normal appearance.   HENT:      Head: Normocephalic and atraumatic.      Right Ear: External ear normal.      Left Ear: External ear normal.      Nose: Nose normal.      Mouth/Throat:      Mouth: Mucous membranes are moist.      Pharynx: Oropharynx is clear.   Eyes:      General: No scleral icterus.  Cardiovascular:      Rate and Rhythm: Normal rate and regular rhythm.      Pulses: Normal pulses.      Heart sounds: Normal heart sounds.   Pulmonary:      Effort: Pulmonary effort is normal. No respiratory " distress.      Breath sounds: Normal breath sounds.   Abdominal:      General: Abdomen is flat. Bowel sounds are normal. There is no distension.      Palpations: Abdomen is soft.      Tenderness: There is abdominal tenderness.   Musculoskeletal:         General: Normal range of motion.      Cervical back: Normal range of motion.   Skin:     General: Skin is warm and dry.      Capillary Refill: Capillary refill takes less than 2 seconds.   Neurological:      Mental Status: She is alert and oriented to person, place, and time.   Psychiatric:         Mood and Affect: Mood normal.         Behavior: Behavior normal.             Labs:  Recent Labs     07/31/23 2333 08/01/23  0507   SODIUM 141 141   POTASSIUM 3.4* 3.9   CHLORIDE 106 108   CO2 25 25   BUN 6* 5*   CREATININE 0.51 0.48*   MAGNESIUM 2.1  --    PHOSPHORUS 3.2  --    CALCIUM 8.5 7.8*     Recent Labs     07/31/23 2333 08/01/23  0507   ALTSGPT 39 32   ASTSGOT 28 21   ALKPHOSPHAT 71 67   TBILIRUBIN 0.5 0.4   LIPASE 12  --    GLUCOSE 96 89     Recent Labs     07/31/23 2333 08/01/23  0507   WBC 7.4 5.7   NEUTSPOLYS 65.90 54.40   LYMPHOCYTES 24.60 35.80   MONOCYTES 8.00 7.70   EOSINOPHILS 0.80 1.40   BASOPHILS 0.40 0.50   ASTSGOT 28 21   ALTSGPT 39 32   ALKPHOSPHAT 71 67   TBILIRUBIN 0.5 0.4     Recent Labs     07/31/23 2333 08/01/23  0507   RBC 4.45 4.27   HEMOGLOBIN 13.3 12.7   HEMATOCRIT 39.6 38.7   PLATELETCT 251 243   PROTHROMBTM 15.8*  --    INR 1.28*  --      No results found for this or any previous visit (from the past 24 hour(s)).    Radiology Review:  DX-PORTABLE FLUOROSCOPY < 1 HOUR Is the patient pregnant? No   Final Result      Portable fluoroscopy utilized for 42 seconds.         INTERPRETING LOCATION: [address] [place] [address], [id number]      [id number]   Final Result      Digitized intraoperative radiograph is submitted for review. This examination is not for diagnostic purpose but for guidance during a surgical procedure. Please see the  patient's chart for full procedural details.         INTERPRETING LOCATION: [address] [place] [address] [place], [id number]      OUTSIDE IMAGES-US ABDOMEN   Final Result      OUTSIDE IMAGES-US ABDOMEN   Final Result      OUTSIDE IMAGES-CT ABDOMEN /PELVIS   Final Result      OUTSIDE IMAGES-CT ABDOMEN /PELVIS   Final Result      IR-US GUIDED PIV    (Results Pending)         MDM (Data Review):   -Records reviewed and summarized in current documentation  -I personally reviewed and interpreted the laboratory results  -I personally reviewed the radiology images    Assessment/Recommendations:  Acute cholecystitis  Choledocholithiasis      RECOMMENDATIONS:  Abdominal x-ray in 1 month to document spontaneous migration of PD stent, orders placed.   I explained that our team would review the images and if she required follow-up EGD we would notify her.   Discussed with patient and she verbalized understanding.    GI team signs off.     Plan of care discussed with patient, Dr. Howe, and Dr. Armstrong    Core Quality Measures   Reviewed items::  Labs, Medications and Radiology reports reviewed

## 2023-08-03 NOTE — CARE PLAN
The patient is Stable - Low risk of patient condition declining or worsening    Shift Goals  Clinical Goals: pain control; advance diet  Patient Goals: pain control; comfort    Progress made toward(s) clinical / shift goals:       Patient is not progressing towards the following goals:

## 2023-08-03 NOTE — DISCHARGE PLANNING
Case Management Discharge Planning    Admission Date: 7/31/2023  GMLOS: 2.3  ALOS: 3    6-Clicks ADL Score: 23  6-Clicks Mobility Score: 20      Anticipated Discharge Dispo:  Home    DME Needed: No    Action(s) Taken: GISSELLE RN received notification pt has questions about FMLA. GISSELLE RN met with the pt at bedside to answer all questions. Pt asking about disability paperwork as well. GISSELLE RN informed pt disability will need to be completed by PCP. GISSELLE RN provided pt with email for FMLA once she has received it from work. Pt stating does not have access to [employer]. GISSELLE RN ask pt what number she would like [employer] code sent too. Pt stating [phone]. Code text to pt.

## 2023-08-03 NOTE — PROGRESS NOTES
Pt is A&O 4  Pain + medicated per MAR   - nausea  Tolerating a Full liquid diet   Incision +  - Drains  + Voids  + flatus  - BM  Up self  SCD's on    Bed alarm off, pt low fall risk per bruce garcia  Reviewed plan of care with patient, bed in lowest position and locked, pt resting comfortably now, call light within reach, all needs met at this time. Interventions will be executed per plan of care

## 2023-08-03 NOTE — PROGRESS NOTES
Hospital Medicine Daily Progress Note    Date of Service  8/2/2023    Chief Complaint  Marilu Velarde is a 33 y.o. female admitted 7/31/2023 with abd pain    Hospital Course  Marilu New is a 33 y.o. female with history of asthma, chronic pain syndrome who presented 7/31/2023 with evaluation for cholecystitis, possible choledocholithiasis.     Work-up for [facility] ER included:  CTAP with contrast: No acute abdominal pelvic abnormality  Ultrasound abdomen limited: Cholelithiasis with positive Lopez sign suggesting cholecystitis.  7 mm dilated mildly CBD.  Downstream obstruction or choledocholithiasis may be present.       Ultimately, patient was transferred to [facility] for higher level of care.  Patient reported having biliary colic complaint as of last Friday.  She went to the emergency room, noted to have gallstones but no admitted and discharged with pain control.  She returned to ER earlier today due to recurrent of abdominal pain.  CTAP and right upper quadrant ultrasound as above.  Transferred to [facility] for possible MRCP and higher level of care.     Interval Problem Update  POD # 1 - Lap golden  8/2 ERCP with stone removal and stent Tolerated procedure well, less hypoxia than after lap golden, advancing diet likely DC tomorrow    I have discussed this patient's plan of care and discharge plan at IDT rounds today with Case Management, Nursing, Nursing leadership, and other members of the IDT team.    Consultants/Specialty  general surgery    Code Status  Full Code    Disposition  The patient is not medically cleared for discharge to home or a post-acute facility.  Anticipate discharge to: home with close outpatient follow-up    I have placed the appropriate orders for post-discharge needs.    Review of Systems  Review of Systems   Constitutional:  Negative for chills and fever.   HENT:  Negative for congestion.    Eyes:  Negative for blurred vision.   Respiratory:  Negative for shortness  of breath.    Cardiovascular:  Negative for chest pain and leg swelling.   Gastrointestinal:  Positive for abdominal pain. Negative for constipation, diarrhea, nausea and vomiting.   Genitourinary:  Negative for dysuria.   Musculoskeletal:  Negative for myalgias.   Skin:  Negative for rash.   Neurological:  Negative for sensory change and focal weakness.        Physical Exam  Temp:  [36.6 °C (97.8 °F)-37.2 °C (98.9 °F)] 36.8 °C (98.2 °F)  Pulse:  [51-91] 58  Resp:  [10-21] 17  BP: (100-126)/(65-91) 100/65  SpO2:  [91 %-100 %] 91 %    Physical Exam  Constitutional:       General: She is not in acute distress.     Appearance: Normal appearance.   HENT:      Head: Normocephalic and atraumatic.      Nose: Nose normal.      Mouth/Throat:      Mouth: Mucous membranes are moist.      Pharynx: Oropharynx is clear.   Eyes:      Conjunctiva/sclera: Conjunctivae normal.      Pupils: Pupils are equal, round, and reactive to light.   Cardiovascular:      Rate and Rhythm: Normal rate and regular rhythm.      Heart sounds: No murmur heard.  Pulmonary:      Effort: Pulmonary effort is normal.      Breath sounds: No wheezing, rhonchi or rales.   Abdominal:      General: There is no distension.      Palpations: Abdomen is soft.      Tenderness: There is abdominal tenderness in the right upper quadrant and epigastric area. There is no guarding or rebound.      Comments: Incisions c/d/i   Musculoskeletal:         General: No swelling or tenderness.      Cervical back: Normal range of motion and neck supple.   Skin:     General: Skin is warm and dry.   Neurological:      Mental Status: She is alert.      GCS: GCS eye subscore is 4. GCS verbal subscore is 5. GCS motor subscore is 6.      Cranial Nerves: No facial asymmetry.   Psychiatric:         Mood and Affect: Mood normal.         Behavior: Behavior normal.         Fluids    Intake/Output Summary (Last 24 hours) at 8/2/2023 1705  Last data filed at 8/2/2023 1525  Gross per 24 hour    Intake 620 ml   Output 1 ml   Net 619 ml         Laboratory  Recent Labs     07/31/23 2333 08/01/23  0507   WBC 7.4 5.7   RBC 4.45 4.27   HEMOGLOBIN 13.3 12.7   HEMATOCRIT 39.6 38.7   MCV 89.0 90.6   MCH 29.9 29.7   MCHC 33.6 32.8   RDW 39.2 40.8   PLATELETCT 251 243   MPV 9.5 9.6       Recent Labs     07/31/23 2333 08/01/23  0507   SODIUM 141 141   POTASSIUM 3.4* 3.9   CHLORIDE 106 108   CO2 25 25   GLUCOSE 96 89   BUN 6* 5*   CREATININE 0.51 0.48*   CALCIUM 8.5 7.8*       Recent Labs     07/31/23 2333   INR 1.28*                 Imaging  DX-PORTABLE FLUOROSCOPY < 1 HOUR Is the patient pregnant? No   Final Result      Portable fluoroscopy utilized for 42 seconds.         INTERPRETING LOCATION: [address] [place] [address], [address]      [id number]   Final Result      Digitized intraoperative radiograph is submitted for review. This examination is not for diagnostic purpose but for guidance during a surgical procedure. Please see the patient's chart for full procedural details.         INTERPRETING LOCATION: [address] [place] [address], [address]      OUTSIDE IMAGES-US ABDOMEN   Final Result      OUTSIDE IMAGES-US ABDOMEN   Final Result      OUTSIDE IMAGES-CT ABDOMEN /PELVIS   Final Result      OUTSIDE IMAGES-CT ABDOMEN /PELVIS   Final Result             Assessment/Plan  * Cholecystitis  Assessment & Plan  Sonographic Lopez and 7mm CBD on RUQ US, s/p lap golden w/ cholangiograph on 8/1  -Supportive pain control  -Stop abx  -Follow cultures  -Surgery following, will appreciate recs    NOLAN (obstructive sleep apnea)  Assessment & Plan  Possible NOLAN, desated while asleep post-op, was evaluated as outpt w/ sleep study but states provider moved before she could see her for results/tx  -Outpt f/u      Choledocholithiasis  Assessment & Plan  s/p lap golden w/ cholangiograph on 8/1 and ERCP w/ stent on 8/2  -Follow up in 1 month for abdominal XR to confirm stent migration, if not will need EGD retrieval     Chronic pain  syndrome  Assessment & Plan  Continue home pain regimen    Insomnia  Assessment & Plan  Trazodone    Asthma  Assessment & Plan  Not in acute exacerbation  -Continue home meds/inhalers         VTE prophylaxis:    enoxaparin ppx      I have performed a physical exam and reviewed and updated ROS and Plan today (8/2/2023). In review of yesterday's note (8/1/2023), there are no changes except as documented above.

## 2023-08-03 NOTE — CARE PLAN
The patient is Stable - Low risk of patient condition declining or worsening    Shift Goals  Clinical Goals: pain control, rest  Patient Goals: pain control    Progress made toward(s) clinical / shift goals:  pain medicated per MAR declining use of ice pack, pt resting at this time.    Patient is not progressing towards the following goals:

## 2023-08-03 NOTE — PROGRESS NOTES
"    DATE: 8/3/2023    POD # 2 - Lap golden  8/2 ERCP with stone removal and stent placement     INTERVAL EVENTS:  Tolerating diet   Adequate pain control  Cleared for DC from surgical standpoint    PHYSICAL EXAMINATION:  Vital Signs: /73   Pulse 71   Temp 36.4 °C (97.5 °F) (Temporal)   Resp 17   Ht 1.549 m (5' 1\")   Wt 70.6 kg (155 lb 10.3 oz)   SpO2 96%   Physical Exam  Vitals and nursing note reviewed.   Constitutional:       General: She is not in acute distress.     Appearance: She is not ill-appearing.   HENT:      Mouth/Throat:      Mouth: Mucous membranes are moist.      Pharynx: Oropharynx is clear.   Pulmonary:      Effort: Pulmonary effort is normal. No respiratory distress.   Abdominal:      Comments: Soft   Non distended  Tenderness with palpation RUQ at expected  Lap sites clear   Skin:     General: Skin is warm and dry.      Capillary Refill: Capillary refill takes less than 2 seconds.   Neurological:      Mental Status: She is alert and oriented to person, place, and time.   Psychiatric:         Behavior: Behavior normal.         Thought Content: Thought content normal.       ASSESSMENT AND PLAN:   Choledocholithiasis  Assessment & Plan  Transferred from outside facility for concern for choledocholithiasis due to slightly enlarged CBD.  Three-day history of mostly right-upper quadrant pain, nausea, and vomiting.  Tender in the right-upper quadrant on exam, no rebound tenderness or guarding, no Lopez's sign.  Labs without leukocytosis or elevated LFTs.  Ultrasound with gallstones, CBD 6.7 mm,  gallbladder wall 2.3 mm.  8/1 Lap golden  8/2 ERCP with stone removal  [facility] service    Discussed patient condition with RN, Patient, and Dr. Saha .    "

## 2023-08-03 NOTE — DISCHARGE INSTRUCTIONS
Tissue Adhesive Wound Care  Some cuts, wounds, lacerations, and incisions can be repaired by using tissue adhesive, also called skin glue. It holds the skin together so healing can happen faster. It forms a strong bond on the skin in about 1 minute, and it reaches its full strength in about 2-3 minutes. The adhesive goes away on its own while the wound is healing. It is important to take good care of your wound while it heals.  Follow these instructions at home:  Wound care         If a bandage (dressing) has been applied, keep it clean and dry.  Follow instructions from your health care provider about how often to change the dressing. Make sure you:  Wash your hands with soap and water for at least 20 seconds before and after you change your dressing. If soap and water are not available, use hand .  Change your dressing as told by your health care provider.  Leave tissue adhesive in place. It will come off on its own after 7-10 days.  Do not scratch, rub, or pick at the adhesive.  Do not place tape over the adhesive. The adhesive could come off the wound when you pull the tape off.  Check the wound daily to make sure it is not starting to reopen.  Protect the wound from further injury until it is healed.  Check your wound area every day for signs of infection. Check for:  More redness, swelling, or pain.  Fluid or blood.  Warmth or a rash around the wound.  Pus or a bad smell.  Hardness or a lump that is not from the adhesive.  Bathing  Do not take baths, swim, or use a hot tub until your health care provider approves. Ask your health care provider if you may take showers. You may only be allowed to take sponge baths.  You can usually shower after the first 24 hours.  Cover the dressing with a watertight covering when you take a shower.  Do not soak the area where there is tissue adhesive.  Do not use any soaps, petroleum jelly products, or ointments on the wound. Certain ointments can weaken the  adhesive.  Eating and drinking  Eat healthy foods to help the wound heal. As told by your health care provider, eat foods rich in:  Protein. These include meat, fish, eggs, dairy, beans, and nuts.  Vitamin A. These include carrots and dark green, leafy vegetables.  Vitamin C. These include citrus fruits, tomatoes, broccoli, and peppers.  Drink enough fluid to keep your urine pale yellow.  General instructions  Protect your wound from the sun when you are outside for the first 6 months, or for as long as told by your health care provider. Apply sunscreen with an SPF of 30 or higher around the scar, or cover it up.  Take over-the-counter and prescription medicines only as told by your health care provider.  Do not use any products that contain nicotine or tobacco. These products include cigarettes, chewing tobacco, and vaping devices, such as e-cigarettes. These can delay wound healing. If you need help quitting, ask your health care provider.  Keep all follow-up visits. This is important.  Contact a health care provider if:  The tissue adhesive becomes soaked with blood or falls off before your wound has healed. The adhesive may need to be replaced.  You have a fever or chills.  You have redness, swelling, or pain around the wound.  You have fluid or blood coming from the wound.  You develop a rash after the adhesive is applied.  You have hardness around the wound site.  Get help right away if:  Your wound reopens.  You have a red streak at the area around the wound.  You have pus or a bad smell coming from the wound.  Summary  The adhesive goes away on its own while the wound is healing. It is important to take good care of your wound at home while it heals.  Always wash your hands with soap and water for at least 20 seconds before and after changing your bandage (dressing).  To help with healing, eat foods that are rich in protein, vitamin A, and vitamin C.  Check your wound area every day for signs of  infection.  This information is not intended to replace advice given to you by your health care provider. Make sure you discuss any questions you have with your health care provider.  Document Revised: 04/25/2022 Document Reviewed: 04/25/2022  Elsevier Patient Education © 2023 [employer]

## 2023-08-03 NOTE — PROGRESS NOTES
AA&Ox4. Denies CP/SOB.  Reporting 10/10 pain. Medicated per MAR.   Educated patient regarding pharmacologic and non pharmacologic modalities for pain management.  Skin per flowsheet.  Tolerating full liquid diet. Denies N/V.  + void. - BM. Last BM PTA. + flatus.   Pt ambulates upself.  All needs met at this time. Call light within reach. Pt calls appropriately. Bed low and locked, non skid socks in place. Hourly rounding in place.

## 2023-08-03 NOTE — DISCHARGE SUMMARY
Discharge Summary    CHIEF COMPLAINT ON ADMISSION  No chief complaint on file.      Reason for Admission  Rule out Obstructing Gallstones     Admission Date  7/31/2023    CODE STATUS  Full Code    HPI & HOSPITAL COURSE  Marilu New is a 33 y.o. female with history of asthma, chronic pain syndrome who presented 7/31/2023 with evaluation for cholecystitis, possible choledocholithiasis.  Patient reported having biliary colic complaint as of last Friday.  She went to the emergency room in [facility] and was noted to have gallstones but not admitted and discharged with pain control.  She returned to ER earlier today due to recurrent of abdominal pain. CTAP with contrast showed no acute abdominal pelvic abnormality but ultrasound abdomen limited: Cholelithiasis with positive Lopez sign suggesting cholecystitis.  7 mm dilated mildly CBD.  Downstream obstruction or choledocholithiasis may be present.    Transferred to [facility] for possible MRCP and higher level of care.   At [facility] patient underwent a Laproscopic cholecystectomy w/ interoperative cholangiogram on 8/1 which noted choledocholithiasis. Pt then underwent ERCP with sphincterotomy and pancreatic duct stent placement on 8/2. Pt tolerated procedures well but was noted to desaturate and require oxygen while sleeping, she has a history of probable NOLAN that was being evaluated as an outpatient and is the likely cause. Recommended for her to follow up on this workup as she may require nightly CPAP. Patient was monitored after her procedures and her diet was advanced which she tolerated. Her pain improved and she was discharged on 08/03/23 with plan to follow up with GI within 2 weeks and have an abdominal xray in 1 month to confirm stent migration, if no migration may require EGD removal.    Therefore, she is discharged in good and stable condition to home with close outpatient follow-up.    The patient met 2-midnight criteria for an inpatient stay at the  time of discharge.    Discharge Date  08/03/23    FOLLOW UP ITEMS POST DISCHARGE  Follow up with GI as above  Follow up with a sleep clinic for NOLAN evaluation    DISCHARGE DIAGNOSES  Principal Problem (Resolved):    Cholecystitis (POA: Unknown)  Active Problems:    Asthma (POA: Unknown)    Insomnia (POA: Unknown)    Chronic pain syndrome (POA: Unknown)    NOLAN (obstructive sleep apnea) (POA: Unknown)  Resolved Problems:    Abdominal pain (POA: Unknown)    Intractable nausea and vomiting (POA: Unknown)    Choledocholithiasis (POA: Unknown)    Hypokalemia (POA: Unknown)      FOLLOW UP  No future appointments.  Cheryl Saha M.D.  [address]  [facility] [id number] [phone]    Follow up on 8/9/2023  Make a telemedicine appointment for Wednesday 8/9 - call office to arrange      MEDICATIONS ON DISCHARGE     Medication List        CHANGE how you take these medications        Instructions   oxyCODONE immediate-release 5 MG Tabs  What changed:   when to take this  reasons to take this  Commonly known as: Roxicodone   Take 1 Tablet by mouth every 6 hours as needed for Severe Pain for up to 4 days.  Dose: 5 mg            CONTINUE taking these medications        Instructions   albuterol 108 (90 Base) MCG/ACT Aers inhalation aerosol   Inhale 2 Puffs every 6 hours as needed for Shortness of Breath.  Dose: 2 Puff     buPROPion 100 MG Tabs  Commonly known as: Wellbutrin   Take 150 mg by mouth 2 times a day. Indications: Major Depressive Disorder  Dose: 150 mg     fexofenadine 60 MG Tabs  Commonly known as: Allegra   Take 60 mg by mouth every day.  Dose: 60 mg     gabapentin 100 MG Caps  Commonly known as: Neurontin   Take 300 mg by mouth 3 times a day. Back pain  Dose: 300 mg     methocarbamol 500 MG Tabs  Commonly known as: Robaxin   Take 500 mg by mouth 2 times a day. PRN back pain  Indications: Musculoskeletal Pain  Dose: 500 mg     montelukast 10 MG Tabs  Commonly known as: Singulair   Take 10 mg by mouth  every day.  Dose: 10 mg     traZODone 50 MG Tabs  Commonly known as: Desyrel   Take 75 mg by mouth every evening. Indications: Trouble Sleeping  Dose: 75 mg              Allergies  Allergies   Allergen Reactions    Amoxicillin Shortness of Breath and Rash     chest    Levaquin [Levofloxacin] Shortness of Breath and Rash     chest    Sulfa Drugs Shortness of Breath and Rash     chest       DIET  Orders Placed This Encounter   Procedures    Diet Order Diet: Regular; Nutrient modifications: (optional): Low Fat     Standing Status:   Standing     Number of Occurrences:   1     Order Specific Question:   Diet:     Answer:   Regular [1]     Order Specific Question:   Nutrient modifications: (optional)     Answer:   Low Fat [5]       ACTIVITY  As tolerated.  Weight bearing as tolerated    CONSULTATIONS  GI  Surgery    PROCEDURES  ERCP 8/2, Dr Rivera  Lap golden 8/1, Dr Saha    LABORATORY  Lab Results   Component Value Date    SODIUM 141 08/01/2023    POTASSIUM 3.9 08/01/2023    CHLORIDE 108 08/01/2023    CO2 25 08/01/2023    GLUCOSE 89 08/01/2023    BUN 5 (L) 08/01/2023    CREATININE 0.48 (L) 08/01/2023        Lab Results   Component Value Date    WBC 5.7 08/01/2023    HEMOGLOBIN 12.7 08/01/2023    HEMATOCRIT 38.7 08/01/2023    PLATELETCT 243 08/01/2023        Total time of the discharge process exceeds 44 minutes.

## 2023-08-04 NOTE — CARE PLAN
The patient is Stable - Low risk of patient condition declining or worsening    Shift Goals  Clinical Goals: pain control; advance diet  Patient Goals: pain control; comfort    Progress made toward(s) clinical / shift goals:  DC'd    Patient is not progressing towards the following goals:

## 2023-08-04 NOTE — PROGRESS NOTES
Discharging Patient home per physician order. Discharged with family. Demonstrated understanding of discharge instructions, follow up appointments, home medications, prescriptions and home care for surgical wound. Ambulating without assistance, voiding without difficulty, pain well controlled, tolerating oral medications, oxygen saturation greater than 90% , tolerating diet. Educational handouts given and discussed. Verbalized understanding of discharge instructions and educational handouts.  Stated several reasons why to return to ED or seek medical attention. All questions answered.  Belongings and dressing supplies with patient at time of discharge.

## 2023-08-06 LAB
BACTERIA BLD CULT: NORMAL
BACTERIA BLD CULT: NORMAL
SIGNIFICANT IND 70042: NORMAL
SIGNIFICANT IND 70042: NORMAL
SITE SITE: NORMAL
SITE SITE: NORMAL
SOURCE SOURCE: NORMAL
SOURCE SOURCE: NORMAL

## 2023-09-14 ENCOUNTER — TELEPHONE (OUTPATIENT)
Dept: GASTROENTEROLOGY | Facility: MEDICAL CENTER | Age: 33
End: 2023-09-14
Payer: COMMERCIAL

## 2023-09-14 ENCOUNTER — HOSPITAL ENCOUNTER (OUTPATIENT)
Dept: RADIOLOGY | Facility: MEDICAL CENTER | Age: 33
End: 2023-09-14
Payer: COMMERCIAL

## 2023-09-14 NOTE — TELEPHONE ENCOUNTER
Dr Rivera reviewed abd xray, pushed from [facility] on 9/14..     Pancreatic duct stent has spontaneously migrated and no further procedures are needed. Spoke to pt to inform her, stent has passed as expected and no further procedures are needed. Pt verbalized understanding.

## (undated) DEVICE — SODIUM CHL IRRIGATION 0.9% 1000ML (12EA/CA)

## (undated) DEVICE — CANNULA O2 COMFORT SOFT EAR ADULT 7 FT TUBING (50/CA)

## (undated) DEVICE — BITE BLOCK, DISP.

## (undated) DEVICE — SET EXTENSION WITH 2 PORTS (48EA/CA) ***PART #2C8610 IS A SUBSTITUTE*****

## (undated) DEVICE — TUBE E-T HI-LO CUFF 7.0MM (10EA/PK)

## (undated) DEVICE — CANNULA W/SEAL 5X100 Z-THRE - ADED KII (12/BX)

## (undated) DEVICE — SYRINGE 30 ML LS (56/BX)

## (undated) DEVICE — CHLORAPREP 26 ML APPLICATOR - ORANGE TINT(25/CA)

## (undated) DEVICE — TROCARCANN&SEAL 5X55 ZTHREAD - 12/BX

## (undated) DEVICE — SUCTION INSTRUMENT YANKAUER BULBOUS TIP W/O VENT (50EA/CA)

## (undated) DEVICE — GOWN WARMING STANDARD FLEX - (30/CA)

## (undated) DEVICE — BLOCK BITE MAXI DENTAL RETENTION RIM (100EA/BX)

## (undated) DEVICE — KIT ANESTHESIA W/CIRCUIT & 3/LT BAG W/FILTER (20EA/CA)

## (undated) DEVICE — JAGTOME RX 39 PRE-LOADED .025 X 260CM

## (undated) DEVICE — SENSOR OXIMETER ADULT SPO2 RD SET (20EA/BX)

## (undated) DEVICE — TROCAR Z THREAD 11 X 100 - BLADED (6/BX)

## (undated) DEVICE — GLOVE BIOGEL INDICATOR SZ 6.5 SURGICAL PF LTX - (50PR/BX 4BX/CA)

## (undated) DEVICE — DEVICE BIOPSY RX BILIARY SYSTEM CAP (10EA/BX)

## (undated) DEVICE — SET LEADWIRE 5 LEAD BEDSIDE DISPOSABLE ECG (1SET OF 5/EA)

## (undated) DEVICE — COVER ENDOSCOPE DISTAL SINGLE USE (20EA/BX)

## (undated) DEVICE — PACK LAP CHOLE OR - (2EA/CA)

## (undated) DEVICE — TROCAR 5X100 BLADED Z-THREAD - KII (6/BX)

## (undated) DEVICE — SYRINGE STERILE 10 ML LL (200/BX)

## (undated) DEVICE — TUBING CLEARLINK DUO-VENT - C-FLO (48EA/CA)

## (undated) DEVICE — CANISTER SUCTION RIGID RED 1500CC (40EA/CA)

## (undated) DEVICE — SPONGE GAUZESTER. 2X2 4-PL - (2/PK 50PK/BX 30BX/CS)

## (undated) DEVICE — COVER LIGHT HANDLE ALC PLUS DISP (18EA/BX)

## (undated) DEVICE — KIT  I.V. START (100EA/CA)

## (undated) DEVICE — CATHETER REDDICK ----MUST ORDER IN MULITPLES OF 10----

## (undated) DEVICE — ELECTRODE 850 FOAM ADHESIVE - HYDROGEL RADIOTRNSPRNT (50/PK)

## (undated) DEVICE — SET TUBING PNEUMOCLEAR HIGH FLOW SMOKE EVACUATION (10EA/BX)

## (undated) DEVICE — SET SUCTION/IRRIGATION WITH DISPOSABLE TIP (6/CA )PART #0250-070-520 IS A SUB

## (undated) DEVICE — GLOVE BIOGEL SZ 6.5 SURGICAL PF LTX (50PR/BX 4BX/CA)

## (undated) DEVICE — NEEDLE INSFL 120MM 14GA VRRS - (20/BX)

## (undated) DEVICE — SUTURE GENERAL

## (undated) DEVICE — BUTTON ENDOSCOPY DISPOSABLE

## (undated) DEVICE — BALLOON RETRIEVAL EXTRACTOR PRO RX   9-12MM

## (undated) DEVICE — ELECTRODE DUAL RETURN W/ CORD - (50/PK)

## (undated) DEVICE — KNIFE RX NEEDLE

## (undated) DEVICE — KIT CUSTOM PROCEDURE SINGLE FOR ENDO  (15/CA)

## (undated) DEVICE — TUBE CONNECTING SUCTION - CLEAR PLASTIC STERILE 72 IN (50EA/CA)

## (undated) DEVICE — CONTAINER, SPECIMEN, STERILE

## (undated) DEVICE — SLEEVE, VASO, THIGH, MED

## (undated) DEVICE — MASK OXYGEN VNYL ADLT MED CONC WITH 7 FOOT TUBING  - (50EA/CA)

## (undated) DEVICE — TOWEL STOP TIMEOUT SAFETY FLAG (40EA/CA)

## (undated) DEVICE — CANISTER SUCTION 3000ML MECHANICAL FILTER AUTO SHUTOFF MEDI-VAC NONSTERILE LF DISP  (40EA/CA)

## (undated) DEVICE — FILM CASSETTE ENDO

## (undated) DEVICE — LACTATED RINGERS INJ 1000 ML - (14EA/CA 60CA/PF)

## (undated) DEVICE — NEPTUNE 4 PORT MANIFOLD - (20/PK)

## (undated) DEVICE — TROCAR5X55 KII SHIELDED SYS - (6/BX)

## (undated) DEVICE — WATER IRRIGATION STERILE 1000ML (12EA/CA)

## (undated) DEVICE — DRESSING TRANSPARENT FILM TEGADERM 2.375 X 2.75"  (100EA/BX)"

## (undated) DEVICE — SUTURE 4-0 VICRYL PLUS FS-2 - 27 INCH (36/BX)

## (undated) DEVICE — PORT AUXILLARY WATER (50EA/BX)